# Patient Record
Sex: FEMALE | Race: OTHER | Employment: OTHER | URBAN - METROPOLITAN AREA
[De-identification: names, ages, dates, MRNs, and addresses within clinical notes are randomized per-mention and may not be internally consistent; named-entity substitution may affect disease eponyms.]

---

## 2018-08-13 LAB
BUN BLDV-MCNC: 15 MG/DL
CALCIUM SERPL-MCNC: 8.5 MG/DL
CHLORIDE BLD-SCNC: 107 MMOL/L
CO2: 27 MMOL/L
CREAT SERPL-MCNC: 0.74 MG/DL
GFR CALCULATED: >60
GLUCOSE BLD-MCNC: 95 MG/DL
POTASSIUM SERPL-SCNC: 4.4 MMOL/L
SODIUM BLD-SCNC: 141 MMOL/L

## 2018-09-15 LAB
BILIRUBIN, URINE: NEGATIVE
BLOOD, URINE: POSITIVE
CLARITY: CLEAR
COLOR: YELLOW
GLUCOSE URINE: NEGATIVE
HCT VFR BLD CALC: 36 % (ref 36–46)
HEMOGLOBIN: 12.3 G/DL (ref 12–16)
KETONES, URINE: NEGATIVE
LEUKOCYTE ESTERASE, URINE: NEGATIVE
NITRITE, URINE: NEGATIVE
PH UA: 6 (ref 4.5–8)
PLATELET # BLD: 182 K/ΜL
PROTEIN UA: NEGATIVE
SPECIFIC GRAVITY, URINE: 1.01
UROBILINOGEN, URINE: NORMAL
WBC # BLD: 5 10^3/ML

## 2018-11-01 ENCOUNTER — OFFICE VISIT (OUTPATIENT)
Dept: FAMILY MEDICINE CLINIC | Age: 68
End: 2018-11-01
Payer: MEDICARE

## 2018-11-01 ENCOUNTER — HOSPITAL ENCOUNTER (OUTPATIENT)
Age: 68
Discharge: HOME | End: 2018-11-03
Payer: MEDICARE

## 2018-11-01 VITALS
RESPIRATION RATE: 16 BRPM | SYSTOLIC BLOOD PRESSURE: 122 MMHG | HEART RATE: 62 BPM | TEMPERATURE: 98.1 F | HEIGHT: 61 IN | OXYGEN SATURATION: 96 % | BODY MASS INDEX: 33.04 KG/M2 | DIASTOLIC BLOOD PRESSURE: 68 MMHG | WEIGHT: 175 LBS

## 2018-11-01 DIAGNOSIS — Z91.81 AT HIGH RISK FOR FALLS: ICD-10-CM

## 2018-11-01 DIAGNOSIS — S83.231A COMPLEX TEAR OF MEDIAL MENISCUS OF RIGHT KNEE AS CURRENT INJURY, INITIAL ENCOUNTER: Primary | ICD-10-CM

## 2018-11-01 DIAGNOSIS — Z23 NEEDS FLU SHOT: ICD-10-CM

## 2018-11-01 DIAGNOSIS — Z79.899 ENCOUNTER FOR LONG-TERM (CURRENT) USE OF HIGH-RISK MEDICATION: ICD-10-CM

## 2018-11-01 DIAGNOSIS — M17.11 ARTHRITIS OF RIGHT KNEE: ICD-10-CM

## 2018-11-01 DIAGNOSIS — E11.42 DIABETIC PERIPHERAL NEUROPATHY (HCC): ICD-10-CM

## 2018-11-01 DIAGNOSIS — E11.9 TYPE 2 DIABETES MELLITUS WITHOUT COMPLICATION, WITH LONG-TERM CURRENT USE OF INSULIN (HCC): ICD-10-CM

## 2018-11-01 DIAGNOSIS — Z79.4 TYPE 2 DIABETES MELLITUS WITHOUT COMPLICATION, WITH LONG-TERM CURRENT USE OF INSULIN (HCC): ICD-10-CM

## 2018-11-01 LAB
AMPHETAMINE SCREEN, URINE: NOT DETECTED
BARBITURATE SCREEN URINE: NOT DETECTED
BENZODIAZEPINE SCREEN, URINE: NOT DETECTED
CANNABINOID SCREEN URINE: NOT DETECTED
COCAINE METABOLITE SCREEN URINE: NOT DETECTED
METHADONE SCREEN, URINE: NOT DETECTED
OPIATE SCREEN URINE: NOT DETECTED
PHENCYCLIDINE SCREEN URINE: NOT DETECTED
PROPOXYPHENE SCREEN: NOT DETECTED

## 2018-11-01 PROCEDURE — 80307 DRUG TEST PRSMV CHEM ANLYZR: CPT

## 2018-11-01 PROCEDURE — 3017F COLORECTAL CA SCREEN DOC REV: CPT | Performed by: FAMILY MEDICINE

## 2018-11-01 PROCEDURE — 1101F PT FALLS ASSESS-DOCD LE1/YR: CPT | Performed by: FAMILY MEDICINE

## 2018-11-01 PROCEDURE — G8400 PT W/DXA NO RESULTS DOC: HCPCS | Performed by: FAMILY MEDICINE

## 2018-11-01 PROCEDURE — 1036F TOBACCO NON-USER: CPT | Performed by: FAMILY MEDICINE

## 2018-11-01 PROCEDURE — 2022F DILAT RTA XM EVC RTNOPTHY: CPT | Performed by: FAMILY MEDICINE

## 2018-11-01 PROCEDURE — G8427 DOCREV CUR MEDS BY ELIG CLIN: HCPCS | Performed by: FAMILY MEDICINE

## 2018-11-01 PROCEDURE — 99204 OFFICE O/P NEW MOD 45 MIN: CPT | Performed by: FAMILY MEDICINE

## 2018-11-01 PROCEDURE — G8417 CALC BMI ABV UP PARAM F/U: HCPCS | Performed by: FAMILY MEDICINE

## 2018-11-01 PROCEDURE — 4040F PNEUMOC VAC/ADMIN/RCVD: CPT | Performed by: FAMILY MEDICINE

## 2018-11-01 PROCEDURE — G8482 FLU IMMUNIZE ORDER/ADMIN: HCPCS | Performed by: FAMILY MEDICINE

## 2018-11-01 PROCEDURE — 1123F ACP DISCUSS/DSCN MKR DOCD: CPT | Performed by: FAMILY MEDICINE

## 2018-11-01 PROCEDURE — 1090F PRES/ABSN URINE INCON ASSESS: CPT | Performed by: FAMILY MEDICINE

## 2018-11-01 PROCEDURE — 3046F HEMOGLOBIN A1C LEVEL >9.0%: CPT | Performed by: FAMILY MEDICINE

## 2018-11-01 PROCEDURE — G0008 ADMIN INFLUENZA VIRUS VAC: HCPCS | Performed by: FAMILY MEDICINE

## 2018-11-01 PROCEDURE — 90662 IIV NO PRSV INCREASED AG IM: CPT | Performed by: FAMILY MEDICINE

## 2018-11-01 RX ORDER — RANITIDINE 300 MG/1
300 TABLET ORAL NIGHTLY
COMMUNITY
End: 2019-01-18 | Stop reason: SDUPTHER

## 2018-11-01 RX ORDER — OMEPRAZOLE 40 MG/1
40 CAPSULE, DELAYED RELEASE ORAL DAILY
COMMUNITY
End: 2018-11-02 | Stop reason: SDUPTHER

## 2018-11-01 RX ORDER — PAROXETINE 10 MG/1
10 TABLET, FILM COATED ORAL EVERY MORNING
COMMUNITY
End: 2019-01-11 | Stop reason: SDUPTHER

## 2018-11-01 RX ORDER — GABAPENTIN 300 MG/1
300 CAPSULE ORAL 2 TIMES DAILY
COMMUNITY
End: 2019-01-18 | Stop reason: SDUPTHER

## 2018-11-01 RX ORDER — ATORVASTATIN CALCIUM 40 MG/1
40 TABLET, FILM COATED ORAL DAILY
COMMUNITY
End: 2019-01-11 | Stop reason: SDUPTHER

## 2018-11-01 RX ORDER — DIVALPROEX SODIUM 500 MG/1
500 TABLET, EXTENDED RELEASE ORAL DAILY
COMMUNITY
End: 2018-12-28 | Stop reason: SDUPTHER

## 2018-11-01 RX ORDER — CHOLECALCIFEROL (VITAMIN D3) 1250 MCG
1 CAPSULE ORAL WEEKLY
COMMUNITY
End: 2018-12-07

## 2018-11-01 RX ORDER — ASPIRIN 81 MG/1
81 TABLET ORAL DAILY
COMMUNITY
End: 2018-12-28 | Stop reason: SDUPTHER

## 2018-11-01 RX ORDER — LORATADINE 10 MG/1
10 TABLET ORAL PRN
COMMUNITY

## 2018-11-01 RX ORDER — INSULIN GLARGINE 100 [IU]/ML
22 INJECTION, SOLUTION SUBCUTANEOUS NIGHTLY
COMMUNITY

## 2018-11-01 RX ORDER — RAMIPRIL 5 MG/1
5 CAPSULE ORAL DAILY
COMMUNITY
End: 2019-01-18 | Stop reason: SDUPTHER

## 2018-11-01 ASSESSMENT — PATIENT HEALTH QUESTIONNAIRE - PHQ9
SUM OF ALL RESPONSES TO PHQ9 QUESTIONS 1 & 2: 0
SUM OF ALL RESPONSES TO PHQ QUESTIONS 1-9: 0
SUM OF ALL RESPONSES TO PHQ QUESTIONS 1-9: 0
1. LITTLE INTEREST OR PLEASURE IN DOING THINGS: 0
2. FEELING DOWN, DEPRESSED OR HOPELESS: 0

## 2018-11-01 NOTE — PROGRESS NOTES
22 Units into the skin nightly      divalproex (DEPAKOTE ER) 500 MG extended release tablet Take 500 mg by mouth daily      loratadine (CLARITIN) 10 MG tablet Take 10 mg by mouth daily      PARoxetine (PAXIL) 10 MG tablet Take 10 mg by mouth every morning      gabapentin (NEURONTIN) 300 MG capsule Take 300 mg by mouth 3 times daily. .      ramipril (ALTACE) 5 MG capsule Take 5 mg by mouth daily      aspirin 81 MG EC tablet Take 81 mg by mouth daily      omeprazole (PRILOSEC) 40 MG delayed release capsule Take 40 mg by mouth daily      Cholecalciferol (VITAMIN D3) 29093 units CAPS Take 1 capsule by mouth once a week       No current facility-administered medications for this visit. No Known Allergies    Family History   Problem Relation Age of Onset    Diabetes Mother     High Blood Pressure Mother     Heart Disease Mother     Diabetes Father     Heart Disease Father     High Blood Pressure Father     Prostate Cancer Father     Colon Cancer Father     Heart Attack Sister     Diabetes Brother     High Blood Pressure Brother     Diabetes Brother     High Blood Pressure Brother     Heart Attack Sister     Alzheimer's Disease Sister     Arthritis Sister     Heart Attack Sister     Diabetes Sister     Diabetes Sister     Heart Attack Sister     Heart Attack Sister     Cancer Son         lymphoma, stomach cancer    No Known Problems Daughter        Social History     Social History    Marital status: Single     Spouse name: N/A    Number of children: 2    Years of education: N/A     Occupational History    Not on file.      Social History Main Topics    Smoking status: Former Smoker     Types: Cigarettes     Quit date: 1978    Smokeless tobacco: Never Used    Alcohol use No    Drug use: No    Sexual activity: Not Currently     Other Topics Concern    Not on file     Social History Narrative    No narrative on file       Review of Systems :  General ROS: negative for - chills, fatigue or fever  Psychological ROS: negative for - anxiety, depression or suicidal ideation  Ophthalmic ROS: negative for - blurry vision or double vision  ENT ROS: nasal congestion. negative for -  nasal discharge, sinus pain or sore throat  Allergy and Immunology ROS: negative for - itchy/watery eyes,  postnasal drip or seasonal allergies  Hematological and Lymphatic ROS: negative for - bleeding problems, bruising, fatigue or swollen lymph nodes  Endocrine ROS: negative for - palpitations, polydipsia/polyuria or unexpected weight changes  Respiratory ROS: negative for - cough, shortness of breath, sputum changes, tachypnea or wheezing  Cardiovascular ROS: negative for - chest pain, dyspnea on exertion, edema, murmur, palpitations, rapid heart rate or shortness of breath  Gastrointestinal ROS: negative for - blood in stools, constipation, diarrhea, heartburn or nausea/vomiting  Genito-Urinary ROS: negative for - dysuria, hematuria, incontinence or nocturia  Musculoskeletal ROS:positive for knee pain.   negative for - gait disturbance, joint stiffness, joint swelling, muscle pain or muscular weakness  Neurological ROS: negative for - bowel and bladder control changes, dizziness, headaches, visual changes or weakness  Dermatological ROS: negative for - dry skin, eczema, mole changes or rash    Vitals:    11/01/18 0828   BP: 122/68   Site: Right Upper Arm   Position: Sitting   Cuff Size: Large Adult   Pulse: 62   Resp: 16   Temp: 98.1 °F (36.7 °C)   TempSrc: Oral   SpO2: 96%   Weight: 175 lb (79.4 kg)   Height: 5' 1\" (1.549 m)       General Appearance:  Alert, cooperative, no distress, appears stated age    Head:  Normocephalic, without obvious abnormality, atraumatic    Eyes:  PERRL, conjunctiva/corneas clear, EOM's intact, both eyes    Ears:  Normal TM's and external ear canals, both ears    Nose:  Nares normal, septum midline,mucosa normal, no drainage or sinus tenderness    Throat:  Lips, mucosa, and tongue normal; teeth and gums normal    Neck:  Supple, symmetrical, trachea midline, no adenopathy; thyroid: not enlarged, symmetric, no tenderness/mass/nodules    Back:  Symmetric, no curvature, ROM normal    Lungs:  Clear to auscultation bilaterally, respirations unlabored    Heart:  Regular rate and rhythm, S1 and S2 normal, no murmur, rub, or gallop    Abdomen:  Soft, non-tender, bowel sounds active all four quadrants, no masses, no organomegaly    Extremities:  Extremities normal, atraumatic, tenderness and decreased ROM of right knee. Pulses:  2+ and symmetric    Skin:  Skin color, texture, no rashes or lesions    Lymph nodes:  No cervical adenopathy    Neurologic:  Normal      1. Complex tear of medial meniscus of right knee as current injury, initial encounter  Reviewed MRI from previous PCP  Will refer to orthopedic surgeon. Will refer to physical therapy  Await further recommendations  - [facility] [place] [facility] and Iza Ibrahim MD    2. Arthritis of right knee  Reviewed MRI from previous PCP  Will refer to orthopedic surgeon. Will refer to physical therapy  Await further recommendations  - [facility] [place] and Iza Ibrahim MD    3. At high risk for falls  Home safety tips provided. 4. Needs flu shot  Flu vaccine updated today  - Influenza, High Dose, 65 yrs  and older, IM, PF, Prefill Syr, 0.5mL (FLUZONE HD)    5. Type 2 diabetes mellitus without complication, with long-term current use of insulin (HCC)  Well controlled  Continue current medications  Diet and exercise were discussed and recommended to the patient. 6. Diabetic peripheral neuropathy (HCC)  Neurontin as prescribed      7. Encounter for long-term (current) use of high-risk medication  Urine drug screen ordered. - Urine Drug Screen; Future      As above.   Call or go to ED immediately if symptoms

## 2018-11-02 RX ORDER — OMEPRAZOLE 40 MG/1
40 CAPSULE, DELAYED RELEASE ORAL DAILY
Qty: 90 CAPSULE | Refills: 1 | Status: SHIPPED | OUTPATIENT
Start: 2018-11-02 | End: 2019-01-18 | Stop reason: SDUPTHER

## 2018-11-02 RX ORDER — OMEPRAZOLE 40 MG/1
40 CAPSULE, DELAYED RELEASE ORAL DAILY
Qty: 30 CAPSULE | Refills: 5 | Status: SHIPPED | OUTPATIENT
Start: 2018-11-02 | End: 2018-11-02 | Stop reason: SDUPTHER

## 2018-11-05 LAB
BILIRUBIN, URINE: NEGATIVE
BLOOD, URINE: POSITIVE
CLARITY: NORMAL
COLOR: YELLOW
DIABETIC RETINOPATHY: NEGATIVE
GLUCOSE URINE: NEGATIVE
HCT VFR BLD CALC: 33.8 % (ref 36–46)
HEMOGLOBIN: 11.4 G/DL (ref 12–16)
KETONES, URINE: NEGATIVE
LEUKOCYTE ESTERASE, URINE: NORMAL
NITRITE, URINE: POSITIVE
PH UA: 6 (ref 4.5–8)
PLATELET # BLD: 151 K/ΜL
PROTEIN UA: NEGATIVE
SPECIFIC GRAVITY, URINE: 1.01
UROBILINOGEN, URINE: NORMAL
WBC # BLD: 4.3 10^3/ML

## 2018-11-09 ENCOUNTER — HOSPITAL ENCOUNTER (OUTPATIENT)
Dept: PHYSICAL THERAPY | Age: 68
Setting detail: THERAPIES SERIES
Discharge: HOME | End: 2018-11-09
Payer: MEDICARE

## 2018-11-09 PROCEDURE — G8978 MOBILITY CURRENT STATUS: HCPCS | Performed by: PHYSICAL THERAPIST

## 2018-11-09 PROCEDURE — 97161 PT EVAL LOW COMPLEX 20 MIN: CPT | Performed by: PHYSICAL THERAPIST

## 2018-11-09 PROCEDURE — G8979 MOBILITY GOAL STATUS: HCPCS | Performed by: PHYSICAL THERAPIST

## 2018-11-09 ASSESSMENT — PAIN DESCRIPTION - LOCATION: LOCATION: KNEE

## 2018-11-09 ASSESSMENT — PAIN DESCRIPTION - PAIN TYPE: TYPE: CHRONIC PAIN

## 2018-11-09 ASSESSMENT — PAIN DESCRIPTION - DESCRIPTORS: DESCRIPTORS: SHARP

## 2018-11-09 ASSESSMENT — PAIN DESCRIPTION - FREQUENCY: FREQUENCY: CONTINUOUS

## 2018-11-09 ASSESSMENT — PAIN SCALES - GENERAL: PAINLEVEL_OUTOF10: 10

## 2018-11-09 ASSESSMENT — PAIN DESCRIPTION - ORIENTATION: ORIENTATION: RIGHT

## 2018-11-12 ENCOUNTER — HOSPITAL ENCOUNTER (OUTPATIENT)
Dept: PHYSICAL THERAPY | Age: 68
Setting detail: THERAPIES SERIES
Discharge: HOME | End: 2018-11-12
Payer: MEDICARE

## 2018-11-12 PROCEDURE — G0283 ELEC STIM OTHER THAN WOUND: HCPCS | Performed by: PHYSICAL THERAPIST

## 2018-11-12 PROCEDURE — 97110 THERAPEUTIC EXERCISES: CPT | Performed by: PHYSICAL THERAPIST

## 2018-11-16 ENCOUNTER — HOSPITAL ENCOUNTER (OUTPATIENT)
Dept: PHYSICAL THERAPY | Age: 68
Setting detail: THERAPIES SERIES
Discharge: HOME | End: 2018-11-16
Payer: MEDICARE

## 2018-11-16 PROCEDURE — 97110 THERAPEUTIC EXERCISES: CPT | Performed by: PHYSICAL THERAPIST

## 2018-11-16 PROCEDURE — G0283 ELEC STIM OTHER THAN WOUND: HCPCS | Performed by: PHYSICAL THERAPIST

## 2018-11-16 NOTE — PROGRESS NOTES
[facility]  Phone: [phone] Fax: [phone]       Physical Therapy Daily Treatment Note  Date:  2018    Patient Name:  Shanice Farley    :  1950  MRN: [id number]    Restrictions/Precautions:    Diagnosis:  complex tear of medial meniscus R knee   Treatment Diagnosis:    Insurance/Certification information: [employer]      Referring Physician:  Jessica Marquez of care signed (Y/N):    Visit# / total visits:  3/  Pain level: 0/10  Time In:  820     Time Out:   910     Subjective:  Pt presents to PT with no c/o knee pain today. Has cont to have intermittent symptoms at home. Cont to walk without AD. Exercises:  Exercise/Equipment Resistance/Repetitions Other comments   bike   x5mins           Heel slides    x15    Quad sets    x15    SAQ   x20    SLR    x10                                                                                                    Other Therapeutic Activities:      Home Exercise Program:      Manual Treatments:      Modalities:  MH/IFC x 20mins     Timed Code Treatment Minutes: Total Treatment Minutes:      Treatment/Activity Tolerance:  [x] Patient tolerated treatment well [] Patient limited by fatigue  [] Patient limited by pain  [] Patient limited by other medical complications  [x] Other: AROM of R knee is WFL . Mild pain reported with there ex. Gait remains stable with heel-toe progression.  Pt instructed to cont with HEP daily     Prognosis: [] Good [] Fair  [] Poor    Patient Requires Follow-up: [] Yes  [] No    Plan:   [x] Continue per plan of care [] Alter current plan (see comments)  [] Plan of care initiated [] Hold pending MD visit [] Discharge  Plan for Next Session:         Electronically signed by:  Shanna Hall DPT

## 2018-11-26 ENCOUNTER — HOSPITAL ENCOUNTER (OUTPATIENT)
Dept: PHYSICAL THERAPY | Age: 68
Setting detail: THERAPIES SERIES
Discharge: HOME | End: 2018-11-26
Payer: MEDICARE

## 2018-11-26 PROCEDURE — 97110 THERAPEUTIC EXERCISES: CPT | Performed by: PHYSICAL THERAPIST

## 2018-11-26 PROCEDURE — G0283 ELEC STIM OTHER THAN WOUND: HCPCS | Performed by: PHYSICAL THERAPIST

## 2018-11-26 NOTE — PROGRESS NOTES
[facility]  Phone: [phone] Fax: [phone]       Physical Therapy Daily Treatment Note  Date:  2018    Patient Name:  Fabi Fowler    :  1950  MRN: [id number]    Restrictions/Precautions:    Diagnosis:  complex tear of medial meniscus R knee   Treatment Diagnosis:    Insurance/Certification information: [employer]      Referring Physician:  Austin Martinez of care signed (Y/N):    Visit# / total visits:  4/  Pain level: 5/10  Time In:  825     Time Out:   915     Subjective:  Pt presents to PT with c/o persistent knee pain. She remains inconsistent with HEP. Ambulating without AD. Exercises:  Exercise/Equipment Resistance/Repetitions Other comments   bike   [id number]           Heel slides    2x15    Quad sets    2x15    SAQ   2x15 2lbs    SLR    2x10 2lbs                                                                                                    Other Therapeutic Activities:      Home Exercise Program:      Manual Treatments:      Modalities:  MH/IFC x 20mins     Timed Code Treatment Minutes: Total Treatment Minutes:      Treatment/Activity Tolerance:  [x] Patient tolerated treatment well [] Patient limited by fatigue  [] Patient limited by pain  [] Patient limited by other medical complications  [x] Other: AROM of R knee is WFL . Pain/tightess reported with there ex. Gait remains stable with heel-toe progression.  PT reinforced that pt should cont with HEP daily     Prognosis: [] Good [] Fair  [] Poor    Patient Requires Follow-up: [] Yes  [] No    Plan:   [x] Continue per plan of care [] Alter current plan (see comments)  [] Plan of care initiated [] Hold pending MD visit [] Discharge  Plan for Next Session:         Electronically signed by:  Reynaldo Barreto DPT

## 2018-11-30 ENCOUNTER — HOSPITAL ENCOUNTER (OUTPATIENT)
Dept: PHYSICAL THERAPY | Age: 68
Setting detail: THERAPIES SERIES
Discharge: HOME | End: 2018-11-30
Payer: MEDICARE

## 2018-11-30 PROCEDURE — G0283 ELEC STIM OTHER THAN WOUND: HCPCS | Performed by: PHYSICAL THERAPIST

## 2018-11-30 PROCEDURE — 97110 THERAPEUTIC EXERCISES: CPT | Performed by: PHYSICAL THERAPIST

## 2018-12-03 ENCOUNTER — HOSPITAL ENCOUNTER (OUTPATIENT)
Dept: PHYSICAL THERAPY | Age: 68
Setting detail: THERAPIES SERIES
Discharge: HOME | End: 2018-12-03
Payer: MEDICARE

## 2018-12-03 PROCEDURE — 97110 THERAPEUTIC EXERCISES: CPT | Performed by: PHYSICAL THERAPIST

## 2018-12-03 PROCEDURE — G0283 ELEC STIM OTHER THAN WOUND: HCPCS | Performed by: PHYSICAL THERAPIST

## 2018-12-07 ENCOUNTER — OFFICE VISIT (OUTPATIENT)
Dept: ORTHOPEDIC SURGERY | Age: 68
End: 2018-12-07
Payer: MEDICARE

## 2018-12-07 VITALS
HEIGHT: 62 IN | BODY MASS INDEX: 31.83 KG/M2 | DIASTOLIC BLOOD PRESSURE: 70 MMHG | WEIGHT: 173 LBS | SYSTOLIC BLOOD PRESSURE: 144 MMHG | TEMPERATURE: 98.2 F | HEART RATE: 64 BPM

## 2018-12-07 DIAGNOSIS — M25.561 CHRONIC PAIN OF RIGHT KNEE: Primary | ICD-10-CM

## 2018-12-07 DIAGNOSIS — G89.29 CHRONIC PAIN OF RIGHT KNEE: Primary | ICD-10-CM

## 2018-12-07 PROCEDURE — 1101F PT FALLS ASSESS-DOCD LE1/YR: CPT | Performed by: ORTHOPAEDIC SURGERY

## 2018-12-07 PROCEDURE — G8417 CALC BMI ABV UP PARAM F/U: HCPCS | Performed by: ORTHOPAEDIC SURGERY

## 2018-12-07 PROCEDURE — G8400 PT W/DXA NO RESULTS DOC: HCPCS | Performed by: ORTHOPAEDIC SURGERY

## 2018-12-07 PROCEDURE — 99203 OFFICE O/P NEW LOW 30 MIN: CPT | Performed by: ORTHOPAEDIC SURGERY

## 2018-12-07 PROCEDURE — G8482 FLU IMMUNIZE ORDER/ADMIN: HCPCS | Performed by: ORTHOPAEDIC SURGERY

## 2018-12-07 PROCEDURE — 1036F TOBACCO NON-USER: CPT | Performed by: ORTHOPAEDIC SURGERY

## 2018-12-07 PROCEDURE — 1090F PRES/ABSN URINE INCON ASSESS: CPT | Performed by: ORTHOPAEDIC SURGERY

## 2018-12-07 PROCEDURE — G8427 DOCREV CUR MEDS BY ELIG CLIN: HCPCS | Performed by: ORTHOPAEDIC SURGERY

## 2018-12-07 PROCEDURE — 3017F COLORECTAL CA SCREEN DOC REV: CPT | Performed by: ORTHOPAEDIC SURGERY

## 2018-12-07 PROCEDURE — 4040F PNEUMOC VAC/ADMIN/RCVD: CPT | Performed by: ORTHOPAEDIC SURGERY

## 2018-12-07 PROCEDURE — 1123F ACP DISCUSS/DSCN MKR DOCD: CPT | Performed by: ORTHOPAEDIC SURGERY

## 2018-12-07 NOTE — PROGRESS NOTES
New Knee Patient     Referring Provider:   Jayson Bolivar,   [address] [facility], [address] [place] [address].     Dear Dr. Alcon Dunaway,    Thank you for your referral of Shamar Laird.  She was seen by me on 12/7/18. Please refer to my office note below. If you have any questions or concerns, please do not hesitate to call. Maciel Gutierres MD  Orthopaedic Surgery                     CHIEF COMPLAINT:   Chief Complaint   Patient presents with    Knee Pain     (R) knee pain, she states that 12 years ago had meniscus sx (2006-- [facility]) but knee has been bothersome x 1 year,, pain with ADLs all the time in pain    Results     (R) knee XR today, MRI report scaned into system-- [facility]    Injections     she states that she was given an injection in [place] 2018--- Hymovis (24mg/3ml each 2 pre-filled)        HPI:    Fortino Khan is a 77y.o. year old female who is seen today  for evaluation of right knee pain. She reports the pain has been ongoing for the past several years. She does not recall a specific injury which started the pain. The pain is mainly along the medial side of the knee. She did have a knee arthroscopy 12 years ago in [place] and had meniscectomy. Did well initially from this. Recently her pain has returned and has become more severe. She has had injections in the past as well as physical therapy, neither of which have helped. She is here because she is interested in surgical options.       PAST MEDICAL HISTORY  Past Medical History:   Diagnosis Date    Anemia     low iron    CAD (coronary artery disease)     Hyperlipidemia     Hypertension     Osteoarthritis     Type 2 diabetes mellitus without complication ([facility] [address]        PAST SURGICAL HISTORY  Past Surgical History:   Procedure Laterality Date    CARDIAC SURGERY  2015    CAD    CATARACT REMOVAL      bilateral    HYSTERECTOMY      KNEE SURGERY Right 2006    right knee surgery         FAMILY HISTORY   Family History

## 2018-12-11 ENCOUNTER — TELEPHONE (OUTPATIENT)
Dept: FAMILY MEDICINE CLINIC | Age: 68
End: 2018-12-11

## 2018-12-11 ENCOUNTER — HOSPITAL ENCOUNTER (OUTPATIENT)
Age: 68
Discharge: HOME | End: 2018-12-13
Payer: MEDICARE

## 2018-12-11 ENCOUNTER — OFFICE VISIT (OUTPATIENT)
Dept: FAMILY MEDICINE CLINIC | Age: 68
End: 2018-12-11
Payer: MEDICARE

## 2018-12-11 VITALS
HEART RATE: 77 BPM | TEMPERATURE: 98.1 F | WEIGHT: 180 LBS | RESPIRATION RATE: 16 BRPM | OXYGEN SATURATION: 97 % | BODY MASS INDEX: 33.99 KG/M2 | HEIGHT: 61 IN | DIASTOLIC BLOOD PRESSURE: 68 MMHG | SYSTOLIC BLOOD PRESSURE: 124 MMHG

## 2018-12-11 DIAGNOSIS — E78.5 HYPERLIPIDEMIA ASSOCIATED WITH TYPE 2 DIABETES MELLITUS (HCC): ICD-10-CM

## 2018-12-11 DIAGNOSIS — I25.10 CORONARY ARTERY DISEASE INVOLVING NATIVE HEART WITHOUT ANGINA PECTORIS, UNSPECIFIED VESSEL OR LESION TYPE: ICD-10-CM

## 2018-12-11 DIAGNOSIS — Z23 NEED FOR PROPHYLACTIC VACCINATION AGAINST STREPTOCOCCUS PNEUMONIAE (PNEUMOCOCCUS): ICD-10-CM

## 2018-12-11 DIAGNOSIS — I10 ESSENTIAL HYPERTENSION: ICD-10-CM

## 2018-12-11 DIAGNOSIS — E11.9 TYPE 2 DIABETES MELLITUS WITHOUT COMPLICATION, WITH LONG-TERM CURRENT USE OF INSULIN (HCC): ICD-10-CM

## 2018-12-11 DIAGNOSIS — Z79.4 TYPE 2 DIABETES MELLITUS WITHOUT COMPLICATION, WITH LONG-TERM CURRENT USE OF INSULIN (HCC): ICD-10-CM

## 2018-12-11 DIAGNOSIS — M17.11 PRIMARY OSTEOARTHRITIS OF RIGHT KNEE: ICD-10-CM

## 2018-12-11 DIAGNOSIS — Z79.4 TYPE 2 DIABETES MELLITUS WITHOUT COMPLICATION, WITH LONG-TERM CURRENT USE OF INSULIN (HCC): Primary | ICD-10-CM

## 2018-12-11 DIAGNOSIS — E11.69 HYPERLIPIDEMIA ASSOCIATED WITH TYPE 2 DIABETES MELLITUS (HCC): ICD-10-CM

## 2018-12-11 DIAGNOSIS — E11.9 TYPE 2 DIABETES MELLITUS WITHOUT COMPLICATION, WITH LONG-TERM CURRENT USE OF INSULIN (HCC): Primary | ICD-10-CM

## 2018-12-11 DIAGNOSIS — Z01.810 PREOPERATIVE CARDIOVASCULAR EXAMINATION: Primary | ICD-10-CM

## 2018-12-11 LAB
ALBUMIN SERPL-MCNC: 4 G/DL (ref 3.5–5.2)
ALP BLD-CCNC: 122 U/L (ref 35–104)
ALT SERPL-CCNC: 8 U/L (ref 0–32)
ANION GAP SERPL CALCULATED.3IONS-SCNC: 16 MMOL/L (ref 7–16)
AST SERPL-CCNC: 15 U/L (ref 0–31)
BASOPHILS ABSOLUTE: 0.02 E9/L (ref 0–0.2)
BASOPHILS RELATIVE PERCENT: 0.5 % (ref 0–2)
BILIRUB SERPL-MCNC: 0.3 MG/DL (ref 0–1.2)
BUN BLDV-MCNC: 14 MG/DL (ref 8–23)
CALCIUM SERPL-MCNC: 8.5 MG/DL (ref 8.6–10.2)
CHLORIDE BLD-SCNC: 102 MMOL/L (ref 98–107)
CHOLESTEROL, TOTAL: 123 MG/DL (ref 0–199)
CO2: 23 MMOL/L (ref 22–29)
CREAT SERPL-MCNC: 0.7 MG/DL (ref 0.5–1)
EOSINOPHILS ABSOLUTE: 0.2 E9/L (ref 0.05–0.5)
EOSINOPHILS RELATIVE PERCENT: 5 % (ref 0–6)
GFR AFRICAN AMERICAN: >60
GFR NON-AFRICAN AMERICAN: >60 ML/MIN/1.73
GLUCOSE BLD-MCNC: 202 MG/DL (ref 74–99)
HBA1C MFR BLD: 6.4 %
HCT VFR BLD CALC: 38.6 % (ref 34–48)
HDLC SERPL-MCNC: 43 MG/DL
HEMOGLOBIN: 12.2 G/DL (ref 11.5–15.5)
IMMATURE GRANULOCYTES #: 0.02 E9/L
IMMATURE GRANULOCYTES %: 0.5 % (ref 0–5)
LDL CHOLESTEROL CALCULATED: 48 MG/DL (ref 0–99)
LYMPHOCYTES ABSOLUTE: 1.02 E9/L (ref 1.5–4)
LYMPHOCYTES RELATIVE PERCENT: 25.4 % (ref 20–42)
MCH RBC QN AUTO: 28.9 PG (ref 26–35)
MCHC RBC AUTO-ENTMCNC: 31.6 % (ref 32–34.5)
MCV RBC AUTO: 91.5 FL (ref 80–99.9)
MONOCYTES ABSOLUTE: 0.3 E9/L (ref 0.1–0.95)
MONOCYTES RELATIVE PERCENT: 7.5 % (ref 2–12)
NEUTROPHILS ABSOLUTE: 2.45 E9/L (ref 1.8–7.3)
NEUTROPHILS RELATIVE PERCENT: 61.1 % (ref 43–80)
PDW BLD-RTO: 13.4 FL (ref 11.5–15)
PLATELET # BLD: 168 E9/L (ref 130–450)
PMV BLD AUTO: 11.7 FL (ref 7–12)
POTASSIUM SERPL-SCNC: 4.3 MMOL/L (ref 3.5–5)
RBC # BLD: 4.22 E12/L (ref 3.5–5.5)
SODIUM BLD-SCNC: 141 MMOL/L (ref 132–146)
TOTAL PROTEIN: 7.3 G/DL (ref 6.4–8.3)
TRIGL SERPL-MCNC: 159 MG/DL (ref 0–149)
VLDLC SERPL CALC-MCNC: 32 MG/DL
WBC # BLD: 4 E9/L (ref 4.5–11.5)

## 2018-12-11 PROCEDURE — G8417 CALC BMI ABV UP PARAM F/U: HCPCS | Performed by: FAMILY MEDICINE

## 2018-12-11 PROCEDURE — 80061 LIPID PANEL: CPT

## 2018-12-11 PROCEDURE — 3017F COLORECTAL CA SCREEN DOC REV: CPT | Performed by: FAMILY MEDICINE

## 2018-12-11 PROCEDURE — 85025 COMPLETE CBC W/AUTO DIFF WBC: CPT

## 2018-12-11 PROCEDURE — 2022F DILAT RTA XM EVC RTNOPTHY: CPT | Performed by: FAMILY MEDICINE

## 2018-12-11 PROCEDURE — G8427 DOCREV CUR MEDS BY ELIG CLIN: HCPCS | Performed by: FAMILY MEDICINE

## 2018-12-11 PROCEDURE — 93000 ELECTROCARDIOGRAM COMPLETE: CPT | Performed by: FAMILY MEDICINE

## 2018-12-11 PROCEDURE — 1101F PT FALLS ASSESS-DOCD LE1/YR: CPT | Performed by: FAMILY MEDICINE

## 2018-12-11 PROCEDURE — 1123F ACP DISCUSS/DSCN MKR DOCD: CPT | Performed by: FAMILY MEDICINE

## 2018-12-11 PROCEDURE — G0009 ADMIN PNEUMOCOCCAL VACCINE: HCPCS | Performed by: FAMILY MEDICINE

## 2018-12-11 PROCEDURE — 1036F TOBACCO NON-USER: CPT | Performed by: FAMILY MEDICINE

## 2018-12-11 PROCEDURE — G8482 FLU IMMUNIZE ORDER/ADMIN: HCPCS | Performed by: FAMILY MEDICINE

## 2018-12-11 PROCEDURE — 3044F HG A1C LEVEL LT 7.0%: CPT | Performed by: FAMILY MEDICINE

## 2018-12-11 PROCEDURE — 80053 COMPREHEN METABOLIC PANEL: CPT

## 2018-12-11 PROCEDURE — 4040F PNEUMOC VAC/ADMIN/RCVD: CPT | Performed by: FAMILY MEDICINE

## 2018-12-11 PROCEDURE — 1090F PRES/ABSN URINE INCON ASSESS: CPT | Performed by: FAMILY MEDICINE

## 2018-12-11 PROCEDURE — 99214 OFFICE O/P EST MOD 30 MIN: CPT | Performed by: FAMILY MEDICINE

## 2018-12-11 PROCEDURE — 83036 HEMOGLOBIN GLYCOSYLATED A1C: CPT | Performed by: FAMILY MEDICINE

## 2018-12-11 PROCEDURE — G8400 PT W/DXA NO RESULTS DOC: HCPCS | Performed by: FAMILY MEDICINE

## 2018-12-11 PROCEDURE — G8598 ASA/ANTIPLAT THER USED: HCPCS | Performed by: FAMILY MEDICINE

## 2018-12-11 PROCEDURE — 90670 PCV13 VACCINE IM: CPT | Performed by: FAMILY MEDICINE

## 2018-12-11 RX ORDER — BLOOD-GLUCOSE METER
1 KIT MISCELLANEOUS DAILY
Qty: 1 KIT | Refills: 0 | Status: SHIPPED | OUTPATIENT
Start: 2018-12-11

## 2018-12-11 ASSESSMENT — ENCOUNTER SYMPTOMS
ABDOMINAL PAIN: 0
WHEEZING: 0
NAUSEA: 0
SHORTNESS OF BREATH: 0
BACK PAIN: 0
CONSTIPATION: 0
RHINORRHEA: 0
VOMITING: 0
SINUS PRESSURE: 0
COUGH: 0
DIARRHEA: 0
SORE THROAT: 0

## 2018-12-11 NOTE — PATIENT INSTRUCTIONS
Patient Education          pneumococcal 13-valent conjugate vaccine  Pronunciation:  ANA charles AUSTIN al 13-VAY lent NALLELY hanson VAX een  Brand:  Prevnar 15  What is the most important information I should know about this vaccine? For children, the pneumococcal 13-valent vaccine is given in a series of shots. The first shot is usually given when the child is 3 months old. The booster shots are then given at 4 months, 6 months, and 15to 13months of age. Adults usually receive only one dose of the vaccine. In a child older than 6 months who has not yet received this vaccine, the first dose can be given any time from the age of 10 months through 11 years (before the 7th birthday). If the child is less than 3year old at the time of the first shot, he or she will need 2 booster doses. If the child is 15 to 22 months old at the time of the first shot, he or she will need 1 booster dose. A child who is 2 years or older at the time of the first shot may need only the one shot and no booster doses. The timing of this vaccination is very important for it to be effective. Your child's individual booster schedule may be different from these guidelines. Follow your doctor's instructions or the schedule recommended by the health department of the state you live in. Keep track of any and all side effects your child has after receiving this vaccine. When the child receives a booster dose, you will need to tell the doctor if the previous shot caused any side effects. You can still receive a vaccine if you have a minor cold. In the case of a more severe illness with a fever or any type of infection, wait until you get better before receiving this vaccine. Becoming infected with pneumococcal disease (such as pneumonia or meningitis) is much more dangerous to your health than receiving this vaccine. However, like any medicine, this vaccine can cause side effects but the risk of serious side effects is extremely low.   Be sure to keep your child on a regular schedule for other immunizations against diseases such as diphtheria, tetanus, pertussis (whooping cough), measles, mumps, hepatitis, or varicella (chicken pox). Your doctor or state health department can provide you with a recommended immunization schedule. What is pneumococcal 13-valent conjugate vaccine? Pneumococcal disease is a serious infection caused by a bacteria. Pneumococcal bacteria can infect the sinuses and inner ear. It can also infect the lungs, blood, and brain, and these conditions can be fatal.  Pneumococcal 13-valent vaccine is used to prevent infection caused by pneumococcal bacteria. This vaccine contains 13 different types of pneumococcal bacteria. Pneumococcal 13-valent vaccine works by exposing you to a small amount of the bacteria or a protein from the bacteria, which causes the body to develop immunity to the disease. This vaccine will not treat an active infection that has already developed in the body. Pneumococcal 13-valent vaccine is for use in children from 6 weeks to 11years old, and in adults who are 48 and older. Becoming infected with pneumococcal disease (such as pneumonia or meningitis) is much more dangerous to your health than receiving this vaccine. However, like any medicine, this vaccine can cause side effects but the risk of serious side effects is extremely low. Like any vaccine, pneumococcal 13-valent vaccine may not provide protection from disease in every person. What should I discuss with my healthcare provider before receiving this vaccine? Keep track of any and all side effects your child has after receiving this vaccine. When the child receives a booster dose, you will need to tell the doctor if the previous shot caused any side effects. You should not receive this vaccine if you ever had a severe allergic reaction to a pneumococcal or diphtheria vaccine.   Before your child receives this vaccine, tell your doctor if the child shot is given and for the next 24 hours. Follow the label directions or your doctor's instructions about how much of this medicine to give your child. It is especially important to prevent fever from occurring in a child who has a seizure disorder such as epilepsy. Be sure to keep your child on a regular schedule for other immunizations such as diphtheria, tetanus, pertussis (whooping cough), hepatitis, and varicella (chicken pox). Your doctor or state health department can provide you with a recommended immunization schedule. What happens if I miss a dose? Contact your doctor if your child will miss a booster dose or gets behind schedule. The next dose should be given as soon as possible. There is no need to start over. Be sure your child receives all recommended doses of this vaccine. If your child does not receive the full series of vaccines, he or she may not be fully protected against the disease. What happens if I overdose? An overdose of this vaccine is unlikely to occur. What should I avoid before or after receiving this vaccine? Follow your doctor's instructions about any restrictions on food, beverages, or activity. What are the possible side effects of this vaccine? Your child should not receive a booster vaccine if he or she had a life-threatening allergic reaction after the first shot. Keep track of any and all side effects your child has after receiving this vaccine. When the child receives a booster dose, you will need to tell the doctor if the previous shot caused any side effects. Get emergency medical help if your child has any of these signs of an allergic reaction: hives; difficulty breathing; swelling of the face, lips, tongue, or throat.   Call your doctor at once if you or your child has a serious side effect such as:  · high fever (103 degrees or higher);  · seizure (convulsions);  · wheezing, trouble breathing;  · severe stomach pain, severe vomiting or diarrhea;  · easy bruising pharmacist can provide more information about this vaccine. Additional information is available from your local health department or the Centers for Disease Control and Prevention. Remember, keep this and all other medicines out of the reach of children, never share your medicines with others, and use this medication only for the indication prescribed. Every effort has been made to ensure that the information provided by [address] is accurate, up-to-date, and complete, but no guarantee is made to that effect. Drug information contained herein may be time sensitive. [facility] information has been compiled for use by healthcare practitioners and consumers in the [place] and therefore [facility] does not warrant that uses outside of the [place] are appropriate, unless specifically indicated otherwise. [facility]'s drug information does not endorse drugs, diagnose patients or recommend therapy. [facility]'s drug information is an informational resource designed to assist licensed healthcare practitioners in caring for their patients and/or to serve consumers viewing this service as a supplement to, and not a substitute for, the expertise, skill, knowledge and judgment of healthcare practitioners. The absence of a warning for a given drug or drug combination in no way should be construed to indicate that the drug or drug combination is safe, effective or appropriate for any given patient. [facility] does not assume any responsibility for any aspect of healthcare administered with the aid of information [facility] provides. The information contained herein is not intended to cover all possible uses, directions, precautions, warnings, drug interactions, allergic reactions, or adverse effects. If you have questions about the drugs you are taking, check with your doctor, nurse or pharmacist.  Copyright 7246-3552 [address]. Version: 4.01. Revision date: 1/16/2012.   Care instructions adapted under license by [facility] [place] ([facility]). If you have questions about a medical condition or this instruction, always ask your healthcare professional. [place] [address] any warranty or liability for your use of this information.

## 2018-12-14 ENCOUNTER — TELEPHONE (OUTPATIENT)
Dept: ADMINISTRATIVE | Age: 68
End: 2018-12-14

## 2018-12-28 RX ORDER — DIVALPROEX SODIUM 500 MG/1
500 TABLET, EXTENDED RELEASE ORAL DAILY
Qty: 30 TABLET | Refills: 0 | Status: SHIPPED | OUTPATIENT
Start: 2018-12-28 | End: 2019-01-11 | Stop reason: SDUPTHER

## 2018-12-28 RX ORDER — ASPIRIN 81 MG/1
81 TABLET ORAL DAILY
Qty: 30 TABLET | Refills: 0 | Status: SHIPPED | OUTPATIENT
Start: 2018-12-28 | End: 2019-01-11 | Stop reason: SDUPTHER

## 2018-12-30 ENCOUNTER — APPOINTMENT (OUTPATIENT)
Dept: GENERAL RADIOLOGY | Age: 68
End: 2018-12-30
Payer: MEDICARE

## 2018-12-30 ENCOUNTER — HOSPITAL ENCOUNTER (EMERGENCY)
Age: 68
Discharge: HOME | End: 2018-12-30
Payer: MEDICARE

## 2018-12-30 VITALS
RESPIRATION RATE: 18 BRPM | TEMPERATURE: 99 F | WEIGHT: 180 LBS | HEIGHT: 61 IN | SYSTOLIC BLOOD PRESSURE: 145 MMHG | DIASTOLIC BLOOD PRESSURE: 66 MMHG | HEART RATE: 66 BPM | BODY MASS INDEX: 33.99 KG/M2 | OXYGEN SATURATION: 96 %

## 2018-12-30 DIAGNOSIS — L02.91 ABSCESS: Primary | ICD-10-CM

## 2018-12-30 PROCEDURE — 99283 EMERGENCY DEPT VISIT LOW MDM: CPT

## 2018-12-30 PROCEDURE — 73590 X-RAY EXAM OF LOWER LEG: CPT

## 2018-12-30 RX ORDER — CLINDAMYCIN HYDROCHLORIDE 150 MG/1
300 CAPSULE ORAL 3 TIMES DAILY
Qty: 60 CAPSULE | Refills: 0 | Status: SHIPPED | OUTPATIENT
Start: 2018-12-30 | End: 2019-01-09

## 2018-12-30 ASSESSMENT — PAIN DESCRIPTION - ORIENTATION: ORIENTATION: RIGHT

## 2018-12-30 ASSESSMENT — PAIN DESCRIPTION - LOCATION: LOCATION: LEG

## 2018-12-30 ASSESSMENT — PAIN DESCRIPTION - DESCRIPTORS: DESCRIPTORS: ACHING

## 2018-12-30 ASSESSMENT — PAIN SCALES - GENERAL: PAINLEVEL_OUTOF10: 7

## 2018-12-30 ASSESSMENT — PAIN DESCRIPTION - PAIN TYPE: TYPE: ACUTE PAIN

## 2019-01-11 RX ORDER — ASPIRIN 81 MG/1
81 TABLET ORAL DAILY
Qty: 30 TABLET | Refills: 3 | Status: ON HOLD | OUTPATIENT
Start: 2019-01-11 | End: 2019-03-13 | Stop reason: HOSPADM

## 2019-01-11 RX ORDER — PAROXETINE 10 MG/1
10 TABLET, FILM COATED ORAL EVERY MORNING
Qty: 30 TABLET | Refills: 3 | Status: SHIPPED | OUTPATIENT
Start: 2019-01-11 | End: 2019-01-11 | Stop reason: SDUPTHER

## 2019-01-11 RX ORDER — ATORVASTATIN CALCIUM 40 MG/1
40 TABLET, FILM COATED ORAL DAILY
Qty: 30 TABLET | Refills: 3 | Status: SHIPPED | OUTPATIENT
Start: 2019-01-11 | End: 2019-01-13 | Stop reason: SDUPTHER

## 2019-01-11 RX ORDER — DIVALPROEX SODIUM 500 MG/1
500 TABLET, EXTENDED RELEASE ORAL DAILY
Qty: 30 TABLET | Refills: 3 | Status: SHIPPED | OUTPATIENT
Start: 2019-01-11 | End: 2019-01-18 | Stop reason: SDUPTHER

## 2019-01-14 RX ORDER — PAROXETINE 10 MG/1
10 TABLET, FILM COATED ORAL EVERY MORNING
Qty: 90 TABLET | Refills: 3 | Status: SHIPPED | OUTPATIENT
Start: 2019-01-14

## 2019-01-14 RX ORDER — ATORVASTATIN CALCIUM 40 MG/1
40 TABLET, FILM COATED ORAL DAILY
Qty: 90 TABLET | Refills: 3 | Status: SHIPPED | OUTPATIENT
Start: 2019-01-14

## 2019-01-18 ENCOUNTER — OFFICE VISIT (OUTPATIENT)
Dept: FAMILY MEDICINE CLINIC | Age: 69
End: 2019-01-18
Payer: MEDICARE

## 2019-01-18 ENCOUNTER — HOSPITAL ENCOUNTER (OUTPATIENT)
Dept: ULTRASOUND IMAGING | Age: 69
Discharge: HOME | End: 2019-01-20
Payer: MEDICARE

## 2019-01-18 VITALS
DIASTOLIC BLOOD PRESSURE: 68 MMHG | HEIGHT: 62 IN | BODY MASS INDEX: 33.23 KG/M2 | OXYGEN SATURATION: 96 % | HEART RATE: 62 BPM | TEMPERATURE: 98.1 F | WEIGHT: 180.6 LBS | SYSTOLIC BLOOD PRESSURE: 128 MMHG

## 2019-01-18 DIAGNOSIS — R22.41 LEG MASS, RIGHT: Primary | ICD-10-CM

## 2019-01-18 DIAGNOSIS — F32.A DEPRESSION, UNSPECIFIED DEPRESSION TYPE: ICD-10-CM

## 2019-01-18 DIAGNOSIS — I10 ESSENTIAL HYPERTENSION: ICD-10-CM

## 2019-01-18 DIAGNOSIS — K21.9 GASTROESOPHAGEAL REFLUX DISEASE WITHOUT ESOPHAGITIS: ICD-10-CM

## 2019-01-18 DIAGNOSIS — R22.41 LEG MASS, RIGHT: ICD-10-CM

## 2019-01-18 DIAGNOSIS — E11.42 DIABETIC PERIPHERAL NEUROPATHY (HCC): ICD-10-CM

## 2019-01-18 DIAGNOSIS — Z79.4 TYPE 2 DIABETES MELLITUS WITHOUT COMPLICATION, WITH LONG-TERM CURRENT USE OF INSULIN (HCC): ICD-10-CM

## 2019-01-18 DIAGNOSIS — E55.9 VITAMIN D DEFICIENCY: ICD-10-CM

## 2019-01-18 DIAGNOSIS — E11.9 TYPE 2 DIABETES MELLITUS WITHOUT COMPLICATION, WITH LONG-TERM CURRENT USE OF INSULIN (HCC): ICD-10-CM

## 2019-01-18 LAB
CREATININE URINE POCT: 200
MICROALBUMIN/CREAT 24H UR: 10 MG/G{CREAT}
MICROALBUMIN/CREAT UR-RTO: <30

## 2019-01-18 PROCEDURE — 1036F TOBACCO NON-USER: CPT | Performed by: PHYSICIAN ASSISTANT

## 2019-01-18 PROCEDURE — 1123F ACP DISCUSS/DSCN MKR DOCD: CPT | Performed by: PHYSICIAN ASSISTANT

## 2019-01-18 PROCEDURE — 4040F PNEUMOC VAC/ADMIN/RCVD: CPT | Performed by: PHYSICIAN ASSISTANT

## 2019-01-18 PROCEDURE — G8427 DOCREV CUR MEDS BY ELIG CLIN: HCPCS | Performed by: PHYSICIAN ASSISTANT

## 2019-01-18 PROCEDURE — 99214 OFFICE O/P EST MOD 30 MIN: CPT | Performed by: PHYSICIAN ASSISTANT

## 2019-01-18 PROCEDURE — 1090F PRES/ABSN URINE INCON ASSESS: CPT | Performed by: PHYSICIAN ASSISTANT

## 2019-01-18 PROCEDURE — 3017F COLORECTAL CA SCREEN DOC REV: CPT | Performed by: PHYSICIAN ASSISTANT

## 2019-01-18 PROCEDURE — 3046F HEMOGLOBIN A1C LEVEL >9.0%: CPT | Performed by: PHYSICIAN ASSISTANT

## 2019-01-18 PROCEDURE — G8482 FLU IMMUNIZE ORDER/ADMIN: HCPCS | Performed by: PHYSICIAN ASSISTANT

## 2019-01-18 PROCEDURE — G8417 CALC BMI ABV UP PARAM F/U: HCPCS | Performed by: PHYSICIAN ASSISTANT

## 2019-01-18 PROCEDURE — 1101F PT FALLS ASSESS-DOCD LE1/YR: CPT | Performed by: PHYSICIAN ASSISTANT

## 2019-01-18 PROCEDURE — 76882 US LMTD JT/FCL EVL NVASC XTR: CPT

## 2019-01-18 PROCEDURE — 2022F DILAT RTA XM EVC RTNOPTHY: CPT | Performed by: PHYSICIAN ASSISTANT

## 2019-01-18 PROCEDURE — 82044 UR ALBUMIN SEMIQUANTITATIVE: CPT | Performed by: PHYSICIAN ASSISTANT

## 2019-01-18 PROCEDURE — G8400 PT W/DXA NO RESULTS DOC: HCPCS | Performed by: PHYSICIAN ASSISTANT

## 2019-01-18 PROCEDURE — 93971 EXTREMITY STUDY: CPT

## 2019-01-18 RX ORDER — OMEPRAZOLE 40 MG/1
40 CAPSULE, DELAYED RELEASE ORAL DAILY
Qty: 90 CAPSULE | Refills: 1 | Status: SHIPPED | OUTPATIENT
Start: 2019-01-18 | End: 2019-08-29 | Stop reason: SDUPTHER

## 2019-01-18 RX ORDER — DIVALPROEX SODIUM 500 MG/1
500 TABLET, EXTENDED RELEASE ORAL DAILY
Qty: 30 TABLET | Refills: 3 | Status: SHIPPED | OUTPATIENT
Start: 2019-01-18 | End: 2019-07-24 | Stop reason: SDUPTHER

## 2019-01-18 RX ORDER — GABAPENTIN 300 MG/1
300 CAPSULE ORAL 2 TIMES DAILY
Qty: 90 CAPSULE | Refills: 1 | Status: SHIPPED | OUTPATIENT
Start: 2019-01-18 | End: 2019-04-18 | Stop reason: SDUPTHER

## 2019-01-18 RX ORDER — OMEPRAZOLE 40 MG/1
40 CAPSULE, DELAYED RELEASE ORAL DAILY
Qty: 90 CAPSULE | Refills: 1 | Status: CANCELLED | OUTPATIENT
Start: 2019-01-18

## 2019-01-18 RX ORDER — RANITIDINE 300 MG/1
TABLET ORAL
Qty: 90 TABLET | Refills: 3 | Status: SHIPPED | OUTPATIENT
Start: 2019-01-18

## 2019-01-18 RX ORDER — RAMIPRIL 5 MG/1
5 CAPSULE ORAL DAILY
Qty: 30 CAPSULE | Refills: 3 | Status: SHIPPED | OUTPATIENT
Start: 2019-01-18 | End: 2019-01-18 | Stop reason: SDUPTHER

## 2019-01-18 RX ORDER — RAMIPRIL 5 MG/1
5 CAPSULE ORAL DAILY
Qty: 90 CAPSULE | Refills: 3 | Status: SHIPPED | OUTPATIENT
Start: 2019-01-18

## 2019-01-18 RX ORDER — RANITIDINE 300 MG/1
300 TABLET ORAL NIGHTLY
Qty: 30 TABLET | Refills: 3 | Status: SHIPPED | OUTPATIENT
Start: 2019-01-18 | End: 2019-01-18 | Stop reason: SDUPTHER

## 2019-01-21 DIAGNOSIS — L03.115 CELLULITIS OF RIGHT LOWER EXTREMITY: Primary | ICD-10-CM

## 2019-01-21 RX ORDER — CEPHALEXIN 500 MG/1
500 CAPSULE ORAL 3 TIMES DAILY
Qty: 30 CAPSULE | Refills: 0 | Status: SHIPPED | OUTPATIENT
Start: 2019-01-21 | End: 2019-02-07

## 2019-01-21 RX ORDER — DOXYCYCLINE HYCLATE 100 MG
100 TABLET ORAL 2 TIMES DAILY WITH MEALS
Qty: 20 TABLET | Refills: 0 | Status: SHIPPED | OUTPATIENT
Start: 2019-01-21 | End: 2019-01-31

## 2019-02-04 ENCOUNTER — HOSPITAL ENCOUNTER (OUTPATIENT)
Age: 69
Discharge: HOME | End: 2019-02-06
Payer: MEDICARE

## 2019-02-04 ENCOUNTER — OFFICE VISIT (OUTPATIENT)
Dept: FAMILY MEDICINE CLINIC | Age: 69
End: 2019-02-04
Payer: MEDICARE

## 2019-02-04 ENCOUNTER — HOSPITAL ENCOUNTER (OUTPATIENT)
Dept: GENERAL RADIOLOGY | Age: 69
Discharge: HOME | End: 2019-02-06
Payer: MEDICARE

## 2019-02-04 VITALS
DIASTOLIC BLOOD PRESSURE: 75 MMHG | OXYGEN SATURATION: 96 % | SYSTOLIC BLOOD PRESSURE: 124 MMHG | HEART RATE: 61 BPM | RESPIRATION RATE: 16 BRPM | TEMPERATURE: 98.3 F | BODY MASS INDEX: 33.03 KG/M2 | WEIGHT: 180.6 LBS

## 2019-02-04 DIAGNOSIS — R22.41 LEG MASS, RIGHT: Primary | ICD-10-CM

## 2019-02-04 DIAGNOSIS — R22.41 LEG MASS, RIGHT: ICD-10-CM

## 2019-02-04 PROCEDURE — 1036F TOBACCO NON-USER: CPT | Performed by: PHYSICIAN ASSISTANT

## 2019-02-04 PROCEDURE — G8400 PT W/DXA NO RESULTS DOC: HCPCS | Performed by: PHYSICIAN ASSISTANT

## 2019-02-04 PROCEDURE — G8417 CALC BMI ABV UP PARAM F/U: HCPCS | Performed by: PHYSICIAN ASSISTANT

## 2019-02-04 PROCEDURE — 1101F PT FALLS ASSESS-DOCD LE1/YR: CPT | Performed by: PHYSICIAN ASSISTANT

## 2019-02-04 PROCEDURE — 1090F PRES/ABSN URINE INCON ASSESS: CPT | Performed by: PHYSICIAN ASSISTANT

## 2019-02-04 PROCEDURE — 3017F COLORECTAL CA SCREEN DOC REV: CPT | Performed by: PHYSICIAN ASSISTANT

## 2019-02-04 PROCEDURE — 73590 X-RAY EXAM OF LOWER LEG: CPT

## 2019-02-04 PROCEDURE — 4040F PNEUMOC VAC/ADMIN/RCVD: CPT | Performed by: PHYSICIAN ASSISTANT

## 2019-02-04 PROCEDURE — 99213 OFFICE O/P EST LOW 20 MIN: CPT | Performed by: PHYSICIAN ASSISTANT

## 2019-02-04 PROCEDURE — G8427 DOCREV CUR MEDS BY ELIG CLIN: HCPCS | Performed by: PHYSICIAN ASSISTANT

## 2019-02-04 PROCEDURE — 1123F ACP DISCUSS/DSCN MKR DOCD: CPT | Performed by: PHYSICIAN ASSISTANT

## 2019-02-04 PROCEDURE — G8482 FLU IMMUNIZE ORDER/ADMIN: HCPCS | Performed by: PHYSICIAN ASSISTANT

## 2019-02-07 ENCOUNTER — OFFICE VISIT (OUTPATIENT)
Dept: CARDIOLOGY CLINIC | Age: 69
End: 2019-02-07
Payer: MEDICARE

## 2019-02-07 VITALS
RESPIRATION RATE: 16 BRPM | DIASTOLIC BLOOD PRESSURE: 62 MMHG | SYSTOLIC BLOOD PRESSURE: 122 MMHG | BODY MASS INDEX: 32.83 KG/M2 | WEIGHT: 178.4 LBS | HEIGHT: 62 IN | HEART RATE: 67 BPM

## 2019-02-07 DIAGNOSIS — E11.42 DIABETIC PERIPHERAL NEUROPATHY (HCC): ICD-10-CM

## 2019-02-07 DIAGNOSIS — E11.69 HYPERLIPIDEMIA ASSOCIATED WITH TYPE 2 DIABETES MELLITUS (HCC): ICD-10-CM

## 2019-02-07 DIAGNOSIS — I10 ESSENTIAL HYPERTENSION: ICD-10-CM

## 2019-02-07 DIAGNOSIS — Z01.818 PRE-OPERATIVE CLEARANCE: Primary | ICD-10-CM

## 2019-02-07 DIAGNOSIS — I25.10 CORONARY ARTERY DISEASE INVOLVING NATIVE HEART WITHOUT ANGINA PECTORIS, UNSPECIFIED VESSEL OR LESION TYPE: ICD-10-CM

## 2019-02-07 DIAGNOSIS — Z79.4 TYPE 2 DIABETES MELLITUS WITHOUT COMPLICATION, WITH LONG-TERM CURRENT USE OF INSULIN (HCC): ICD-10-CM

## 2019-02-07 DIAGNOSIS — E11.9 TYPE 2 DIABETES MELLITUS WITHOUT COMPLICATION, WITH LONG-TERM CURRENT USE OF INSULIN (HCC): ICD-10-CM

## 2019-02-07 DIAGNOSIS — E78.5 HYPERLIPIDEMIA ASSOCIATED WITH TYPE 2 DIABETES MELLITUS (HCC): ICD-10-CM

## 2019-02-07 PROCEDURE — 1036F TOBACCO NON-USER: CPT | Performed by: INTERNAL MEDICINE

## 2019-02-07 PROCEDURE — 2022F DILAT RTA XM EVC RTNOPTHY: CPT | Performed by: INTERNAL MEDICINE

## 2019-02-07 PROCEDURE — 3017F COLORECTAL CA SCREEN DOC REV: CPT | Performed by: INTERNAL MEDICINE

## 2019-02-07 PROCEDURE — 4040F PNEUMOC VAC/ADMIN/RCVD: CPT | Performed by: INTERNAL MEDICINE

## 2019-02-07 PROCEDURE — G8482 FLU IMMUNIZE ORDER/ADMIN: HCPCS | Performed by: INTERNAL MEDICINE

## 2019-02-07 PROCEDURE — 1090F PRES/ABSN URINE INCON ASSESS: CPT | Performed by: INTERNAL MEDICINE

## 2019-02-07 PROCEDURE — G8400 PT W/DXA NO RESULTS DOC: HCPCS | Performed by: INTERNAL MEDICINE

## 2019-02-07 PROCEDURE — G8427 DOCREV CUR MEDS BY ELIG CLIN: HCPCS | Performed by: INTERNAL MEDICINE

## 2019-02-07 PROCEDURE — 93000 ELECTROCARDIOGRAM COMPLETE: CPT | Performed by: INTERNAL MEDICINE

## 2019-02-07 PROCEDURE — G8417 CALC BMI ABV UP PARAM F/U: HCPCS | Performed by: INTERNAL MEDICINE

## 2019-02-07 PROCEDURE — 99204 OFFICE O/P NEW MOD 45 MIN: CPT | Performed by: INTERNAL MEDICINE

## 2019-02-07 PROCEDURE — G8598 ASA/ANTIPLAT THER USED: HCPCS | Performed by: INTERNAL MEDICINE

## 2019-02-07 PROCEDURE — 1123F ACP DISCUSS/DSCN MKR DOCD: CPT | Performed by: INTERNAL MEDICINE

## 2019-02-07 PROCEDURE — 1101F PT FALLS ASSESS-DOCD LE1/YR: CPT | Performed by: INTERNAL MEDICINE

## 2019-02-07 PROCEDURE — 3046F HEMOGLOBIN A1C LEVEL >9.0%: CPT | Performed by: INTERNAL MEDICINE

## 2019-02-15 ENCOUNTER — HOSPITAL ENCOUNTER (OUTPATIENT)
Dept: CARDIOLOGY | Age: 69
Discharge: HOME | End: 2019-02-15
Payer: MEDICARE

## 2019-02-15 VITALS
WEIGHT: 178 LBS | HEIGHT: 62 IN | DIASTOLIC BLOOD PRESSURE: 70 MMHG | BODY MASS INDEX: 32.76 KG/M2 | HEART RATE: 102 BPM | SYSTOLIC BLOOD PRESSURE: 136 MMHG

## 2019-02-15 DIAGNOSIS — Z79.4 TYPE 2 DIABETES MELLITUS WITHOUT COMPLICATION, WITH LONG-TERM CURRENT USE OF INSULIN (HCC): ICD-10-CM

## 2019-02-15 DIAGNOSIS — I25.10 CORONARY ARTERY DISEASE INVOLVING NATIVE HEART WITHOUT ANGINA PECTORIS, UNSPECIFIED VESSEL OR LESION TYPE: Primary | ICD-10-CM

## 2019-02-15 DIAGNOSIS — I10 ESSENTIAL HYPERTENSION: ICD-10-CM

## 2019-02-15 DIAGNOSIS — E11.42 DIABETIC PERIPHERAL NEUROPATHY (HCC): ICD-10-CM

## 2019-02-15 DIAGNOSIS — E11.69 HYPERLIPIDEMIA ASSOCIATED WITH TYPE 2 DIABETES MELLITUS (HCC): ICD-10-CM

## 2019-02-15 DIAGNOSIS — Z01.818 PRE-OPERATIVE CLEARANCE: ICD-10-CM

## 2019-02-15 DIAGNOSIS — E78.5 HYPERLIPIDEMIA ASSOCIATED WITH TYPE 2 DIABETES MELLITUS (HCC): ICD-10-CM

## 2019-02-15 DIAGNOSIS — E11.9 TYPE 2 DIABETES MELLITUS WITHOUT COMPLICATION, WITH LONG-TERM CURRENT USE OF INSULIN (HCC): ICD-10-CM

## 2019-02-15 PROCEDURE — 3430000000 HC RX DIAGNOSTIC RADIOPHARMACEUTICAL: Performed by: INTERNAL MEDICINE

## 2019-02-15 PROCEDURE — 78452 HT MUSCLE IMAGE SPECT MULT: CPT

## 2019-02-15 PROCEDURE — A9500 TC99M SESTAMIBI: HCPCS | Performed by: INTERNAL MEDICINE

## 2019-02-15 PROCEDURE — 6360000002 HC RX W HCPCS: Performed by: INTERNAL MEDICINE

## 2019-02-15 PROCEDURE — 2580000003 HC RX 258: Performed by: INTERNAL MEDICINE

## 2019-02-15 PROCEDURE — 93017 CV STRESS TEST TRACING ONLY: CPT

## 2019-02-15 RX ORDER — SODIUM CHLORIDE 0.9 % (FLUSH) 0.9 %
10 SYRINGE (ML) INJECTION PRN
Status: DISCONTINUED | OUTPATIENT
Start: 2019-02-15 | End: 2019-02-16 | Stop reason: HOSPADM

## 2019-02-15 RX ADMIN — Medication 31.8 MILLICURIE: at 07:55

## 2019-02-15 RX ADMIN — REGADENOSON 0.4 MG: 0.08 INJECTION, SOLUTION INTRAVENOUS at 07:55

## 2019-02-15 RX ADMIN — Medication 10 ML: at 07:55

## 2019-02-15 RX ADMIN — Medication 10.2 MILLICURIE: at 06:50

## 2019-02-15 RX ADMIN — Medication 10 ML: at 06:51

## 2019-02-15 RX ADMIN — Medication 10 ML: at 07:56

## 2019-02-20 ENCOUNTER — TELEPHONE (OUTPATIENT)
Dept: FAMILY MEDICINE CLINIC | Age: 69
End: 2019-02-20

## 2019-02-21 ENCOUNTER — TELEPHONE (OUTPATIENT)
Dept: ORTHOPEDIC SURGERY | Age: 69
End: 2019-02-21

## 2019-02-25 DIAGNOSIS — M17.11 PRIMARY OSTEOARTHRITIS OF RIGHT KNEE: Primary | ICD-10-CM

## 2019-03-05 ENCOUNTER — HOSPITAL ENCOUNTER (OUTPATIENT)
Dept: PREADMISSION TESTING | Age: 69
Discharge: HOME | End: 2019-03-05

## 2019-03-07 ENCOUNTER — HOSPITAL ENCOUNTER (OUTPATIENT)
Dept: CT IMAGING | Age: 69
Discharge: HOME | End: 2019-03-09
Payer: MEDICARE

## 2019-03-07 ENCOUNTER — ANESTHESIA EVENT (OUTPATIENT)
Dept: OPERATING ROOM | Age: 69
DRG: 470 | End: 2019-03-07
Payer: MEDICARE

## 2019-03-07 DIAGNOSIS — M17.11 PRIMARY OSTEOARTHRITIS OF RIGHT KNEE: ICD-10-CM

## 2019-03-07 PROCEDURE — 73700 CT LOWER EXTREMITY W/O DYE: CPT

## 2019-03-07 ASSESSMENT — KOOS JR
GOING UP OR DOWN STAIRS: 3
BENDING TO THE FLOOR TO PICK UP OBJECT: 3
HOW SEVERE IS YOUR KNEE STIFFNESS AFTER FIRST WAKING IN MORNING: 2
STANDING UPRIGHT: 2
RISING FROM SITTING: 2
STRAIGHTENING KNEE FULLY: 2
TWISING OR PIVOTING ON KNEE: 2

## 2019-03-08 ENCOUNTER — OFFICE VISIT (OUTPATIENT)
Dept: ORTHOPEDIC SURGERY | Age: 69
End: 2019-03-08
Payer: MEDICARE

## 2019-03-08 VITALS
BODY MASS INDEX: 33.49 KG/M2 | SYSTOLIC BLOOD PRESSURE: 150 MMHG | TEMPERATURE: 99 F | HEART RATE: 58 BPM | DIASTOLIC BLOOD PRESSURE: 66 MMHG | WEIGHT: 182 LBS | HEIGHT: 62 IN

## 2019-03-08 DIAGNOSIS — M17.11 PRIMARY OSTEOARTHRITIS OF RIGHT KNEE: Primary | ICD-10-CM

## 2019-03-08 PROCEDURE — G8400 PT W/DXA NO RESULTS DOC: HCPCS | Performed by: ORTHOPAEDIC SURGERY

## 2019-03-08 PROCEDURE — 1123F ACP DISCUSS/DSCN MKR DOCD: CPT | Performed by: ORTHOPAEDIC SURGERY

## 2019-03-08 PROCEDURE — 1036F TOBACCO NON-USER: CPT | Performed by: ORTHOPAEDIC SURGERY

## 2019-03-08 PROCEDURE — 3017F COLORECTAL CA SCREEN DOC REV: CPT | Performed by: ORTHOPAEDIC SURGERY

## 2019-03-08 PROCEDURE — G8427 DOCREV CUR MEDS BY ELIG CLIN: HCPCS | Performed by: ORTHOPAEDIC SURGERY

## 2019-03-08 PROCEDURE — 99213 OFFICE O/P EST LOW 20 MIN: CPT | Performed by: ORTHOPAEDIC SURGERY

## 2019-03-08 PROCEDURE — G8482 FLU IMMUNIZE ORDER/ADMIN: HCPCS | Performed by: ORTHOPAEDIC SURGERY

## 2019-03-08 PROCEDURE — 1101F PT FALLS ASSESS-DOCD LE1/YR: CPT | Performed by: ORTHOPAEDIC SURGERY

## 2019-03-08 PROCEDURE — 1090F PRES/ABSN URINE INCON ASSESS: CPT | Performed by: ORTHOPAEDIC SURGERY

## 2019-03-08 PROCEDURE — G8417 CALC BMI ABV UP PARAM F/U: HCPCS | Performed by: ORTHOPAEDIC SURGERY

## 2019-03-08 PROCEDURE — 4040F PNEUMOC VAC/ADMIN/RCVD: CPT | Performed by: ORTHOPAEDIC SURGERY

## 2019-03-08 PROCEDURE — G8598 ASA/ANTIPLAT THER USED: HCPCS | Performed by: ORTHOPAEDIC SURGERY

## 2019-03-11 ENCOUNTER — APPOINTMENT (OUTPATIENT)
Dept: GENERAL RADIOLOGY | Age: 69
DRG: 470 | End: 2019-03-11
Attending: ORTHOPAEDIC SURGERY
Payer: MEDICARE

## 2019-03-11 ENCOUNTER — ANESTHESIA (OUTPATIENT)
Dept: OPERATING ROOM | Age: 69
DRG: 470 | End: 2019-03-11
Payer: MEDICARE

## 2019-03-11 ENCOUNTER — HOSPITAL ENCOUNTER (INPATIENT)
Age: 69
LOS: 3 days | Discharge: NURSING, REHAB OR LONG-TERM CARE | DRG: 470 | End: 2019-03-14
Attending: ORTHOPAEDIC SURGERY | Admitting: ORTHOPAEDIC SURGERY
Payer: MEDICARE

## 2019-03-11 VITALS — DIASTOLIC BLOOD PRESSURE: 59 MMHG | OXYGEN SATURATION: 100 % | SYSTOLIC BLOOD PRESSURE: 129 MMHG | TEMPERATURE: 72 F

## 2019-03-11 DIAGNOSIS — M17.9 OSTEOARTHRITIS OF KNEE, UNSPECIFIED LATERALITY, UNSPECIFIED OSTEOARTHRITIS TYPE: Primary | ICD-10-CM

## 2019-03-11 DIAGNOSIS — Z96.651 STATUS POST TOTAL RIGHT KNEE REPLACEMENT: ICD-10-CM

## 2019-03-11 PROBLEM — Z96.659 STATUS POST TOTAL KNEE REPLACEMENT: Status: ACTIVE | Noted: 2019-03-11

## 2019-03-11 LAB
HBA1C MFR BLD: 6.5 % (ref 4–5.6)
HCT VFR BLD CALC: 36.3 % (ref 34–48)
HEMOGLOBIN: 11.5 G/DL (ref 11.5–15.5)
MCH RBC QN AUTO: 29.4 PG (ref 26–35)
MCHC RBC AUTO-ENTMCNC: 31.7 % (ref 32–34.5)
MCV RBC AUTO: 92.8 FL (ref 80–99.9)
METER GLUCOSE: 131 MG/DL (ref 74–99)
METER GLUCOSE: 178 MG/DL (ref 74–99)
METER GLUCOSE: 216 MG/DL (ref 74–99)
METER GLUCOSE: 275 MG/DL (ref 74–99)
PDW BLD-RTO: 13.7 FL (ref 11.5–15)
PLATELET # BLD: 139 E9/L (ref 130–450)
PMV BLD AUTO: 10.3 FL (ref 7–12)
PREALBUMIN: 22 MG/DL (ref 20–40)
RBC # BLD: 3.91 E12/L (ref 3.5–5.5)
WBC # BLD: 4.5 E9/L (ref 4.5–11.5)

## 2019-03-11 PROCEDURE — 3600000015 HC SURGERY LEVEL 5 ADDTL 15MIN: Performed by: ORTHOPAEDIC SURGERY

## 2019-03-11 PROCEDURE — 3700000000 HC ANESTHESIA ATTENDED CARE: Performed by: ORTHOPAEDIC SURGERY

## 2019-03-11 PROCEDURE — 1200000000 HC SEMI PRIVATE

## 2019-03-11 PROCEDURE — 6360000002 HC RX W HCPCS: Performed by: ANESTHESIOLOGY

## 2019-03-11 PROCEDURE — 8E0Y0CZ ROBOTIC ASSISTED PROCEDURE OF LOWER EXTREMITY, OPEN APPROACH: ICD-10-PCS | Performed by: ORTHOPAEDIC SURGERY

## 2019-03-11 PROCEDURE — 2700000000 HC OXYGEN THERAPY PER DAY

## 2019-03-11 PROCEDURE — 2500000003 HC RX 250 WO HCPCS: Performed by: ORTHOPAEDIC SURGERY

## 2019-03-11 PROCEDURE — 7100000000 HC PACU RECOVERY - FIRST 15 MIN: Performed by: ORTHOPAEDIC SURGERY

## 2019-03-11 PROCEDURE — 3600000005 HC SURGERY LEVEL 5 BASE: Performed by: ORTHOPAEDIC SURGERY

## 2019-03-11 PROCEDURE — 6370000000 HC RX 637 (ALT 250 FOR IP): Performed by: ANESTHESIOLOGY

## 2019-03-11 PROCEDURE — 2580000003 HC RX 258: Performed by: ORTHOPAEDIC SURGERY

## 2019-03-11 PROCEDURE — 6360000002 HC RX W HCPCS: Performed by: ORTHOPAEDIC SURGERY

## 2019-03-11 PROCEDURE — 82962 GLUCOSE BLOOD TEST: CPT

## 2019-03-11 PROCEDURE — 85027 COMPLETE CBC AUTOMATED: CPT

## 2019-03-11 PROCEDURE — 2709999900 HC NON-CHARGEABLE SUPPLY: Performed by: ORTHOPAEDIC SURGERY

## 2019-03-11 PROCEDURE — 27447 TOTAL KNEE ARTHROPLASTY: CPT | Performed by: ORTHOPAEDIC SURGERY

## 2019-03-11 PROCEDURE — 83036 HEMOGLOBIN GLYCOSYLATED A1C: CPT

## 2019-03-11 PROCEDURE — 6360000002 HC RX W HCPCS: Performed by: NURSE ANESTHETIST, CERTIFIED REGISTERED

## 2019-03-11 PROCEDURE — 0SRC0J9 REPLACEMENT OF RIGHT KNEE JOINT WITH SYNTHETIC SUBSTITUTE, CEMENTED, OPEN APPROACH: ICD-10-PCS | Performed by: ORTHOPAEDIC SURGERY

## 2019-03-11 PROCEDURE — 88305 TISSUE EXAM BY PATHOLOGIST: CPT

## 2019-03-11 PROCEDURE — 84134 ASSAY OF PREALBUMIN: CPT

## 2019-03-11 PROCEDURE — 6370000000 HC RX 637 (ALT 250 FOR IP): Performed by: ORTHOPAEDIC SURGERY

## 2019-03-11 PROCEDURE — 73560 X-RAY EXAM OF KNEE 1 OR 2: CPT

## 2019-03-11 PROCEDURE — 2500000003 HC RX 250 WO HCPCS: Performed by: ANESTHESIOLOGY

## 2019-03-11 PROCEDURE — 36415 COLL VENOUS BLD VENIPUNCTURE: CPT

## 2019-03-11 PROCEDURE — 76942 ECHO GUIDE FOR BIOPSY: CPT | Performed by: ANESTHESIOLOGY

## 2019-03-11 PROCEDURE — 7100000001 HC PACU RECOVERY - ADDTL 15 MIN: Performed by: ORTHOPAEDIC SURGERY

## 2019-03-11 PROCEDURE — 88311 DECALCIFY TISSUE: CPT

## 2019-03-11 PROCEDURE — 3700000001 HC ADD 15 MINUTES (ANESTHESIA): Performed by: ORTHOPAEDIC SURGERY

## 2019-03-11 PROCEDURE — C1776 JOINT DEVICE (IMPLANTABLE): HCPCS | Performed by: ORTHOPAEDIC SURGERY

## 2019-03-11 PROCEDURE — 3E0T3BZ INTRODUCTION OF ANESTHETIC AGENT INTO PERIPHERAL NERVES AND PLEXI, PERCUTANEOUS APPROACH: ICD-10-PCS | Performed by: ANESTHESIOLOGY

## 2019-03-11 PROCEDURE — C1713 ANCHOR/SCREW BN/BN,TIS/BN: HCPCS | Performed by: ORTHOPAEDIC SURGERY

## 2019-03-11 PROCEDURE — 2580000003 HC RX 258: Performed by: NURSE ANESTHETIST, CERTIFIED REGISTERED

## 2019-03-11 PROCEDURE — 2720000010 HC SURG SUPPLY STERILE: Performed by: ORTHOPAEDIC SURGERY

## 2019-03-11 PROCEDURE — 2500000003 HC RX 250 WO HCPCS: Performed by: NURSE ANESTHETIST, CERTIFIED REGISTERED

## 2019-03-11 DEVICE — BASEPLATE TIB SZ 3 KNEE TRITANIUM CEM PRI LO PROF TRIATHLON: Type: IMPLANTABLE DEVICE | Site: KNEE | Status: FUNCTIONAL

## 2019-03-11 DEVICE — IMPLANTABLE DEVICE: Type: IMPLANTABLE DEVICE | Site: KNEE | Status: FUNCTIONAL

## 2019-03-11 DEVICE — IMPLANT PAT DIA32MM THK10MM X3 ASYM TRIATHLON: Type: IMPLANTABLE DEVICE | Site: KNEE | Status: FUNCTIONAL

## 2019-03-11 DEVICE — CEMENT BNE RADIOPAQUE FAST SET ACRYL RESIN HI VISC SIMPLEXHV: Type: IMPLANTABLE DEVICE | Site: KNEE | Status: FUNCTIONAL

## 2019-03-11 RX ORDER — CEFAZOLIN SODIUM 2 G/50ML
2 SOLUTION INTRAVENOUS EVERY 8 HOURS
Status: COMPLETED | OUTPATIENT
Start: 2019-03-11 | End: 2019-03-12

## 2019-03-11 RX ORDER — ROPIVACAINE HYDROCHLORIDE 5 MG/ML
INJECTION, SOLUTION EPIDURAL; INFILTRATION; PERINEURAL PRN
Status: DISCONTINUED | OUTPATIENT
Start: 2019-03-11 | End: 2019-03-11 | Stop reason: SDUPTHER

## 2019-03-11 RX ORDER — LIDOCAINE HYDROCHLORIDE 10 MG/ML
INJECTION, SOLUTION INFILTRATION; PERINEURAL PRN
Status: DISCONTINUED | OUTPATIENT
Start: 2019-03-11 | End: 2019-03-11 | Stop reason: SDUPTHER

## 2019-03-11 RX ORDER — FENTANYL CITRATE 50 UG/ML
50 INJECTION, SOLUTION INTRAMUSCULAR; INTRAVENOUS ONCE
Status: DISCONTINUED | OUTPATIENT
Start: 2019-03-11 | End: 2019-03-11 | Stop reason: HOSPADM

## 2019-03-11 RX ORDER — MORPHINE SULFATE 2 MG/ML
4 INJECTION, SOLUTION INTRAMUSCULAR; INTRAVENOUS EVERY 4 HOURS PRN
Status: DISCONTINUED | OUTPATIENT
Start: 2019-03-11 | End: 2019-03-14 | Stop reason: HOSPADM

## 2019-03-11 RX ORDER — DIVALPROEX SODIUM 250 MG/1
500 TABLET, EXTENDED RELEASE ORAL DAILY
Status: DISCONTINUED | OUTPATIENT
Start: 2019-03-11 | End: 2019-03-14 | Stop reason: HOSPADM

## 2019-03-11 RX ORDER — DEXAMETHASONE SODIUM PHOSPHATE 10 MG/ML
INJECTION, SOLUTION INTRAMUSCULAR; INTRAVENOUS PRN
Status: DISCONTINUED | OUTPATIENT
Start: 2019-03-11 | End: 2019-03-11 | Stop reason: SDUPTHER

## 2019-03-11 RX ORDER — MORPHINE SULFATE 2 MG/ML
2 INJECTION, SOLUTION INTRAMUSCULAR; INTRAVENOUS EVERY 4 HOURS PRN
Status: DISCONTINUED | OUTPATIENT
Start: 2019-03-11 | End: 2019-03-14 | Stop reason: HOSPADM

## 2019-03-11 RX ORDER — SODIUM CHLORIDE, SODIUM LACTATE, POTASSIUM CHLORIDE, CALCIUM CHLORIDE 600; 310; 30; 20 MG/100ML; MG/100ML; MG/100ML; MG/100ML
INJECTION, SOLUTION INTRAVENOUS CONTINUOUS PRN
Status: DISCONTINUED | OUTPATIENT
Start: 2019-03-11 | End: 2019-03-11 | Stop reason: SDUPTHER

## 2019-03-11 RX ORDER — SODIUM CHLORIDE 0.9 % (FLUSH) 0.9 %
10 SYRINGE (ML) INJECTION EVERY 12 HOURS SCHEDULED
Status: DISCONTINUED | OUTPATIENT
Start: 2019-03-11 | End: 2019-03-11 | Stop reason: HOSPADM

## 2019-03-11 RX ORDER — FENTANYL CITRATE 50 UG/ML
INJECTION, SOLUTION INTRAMUSCULAR; INTRAVENOUS PRN
Status: DISCONTINUED | OUTPATIENT
Start: 2019-03-11 | End: 2019-03-11 | Stop reason: SDUPTHER

## 2019-03-11 RX ORDER — SODIUM CHLORIDE 0.9 % (FLUSH) 0.9 %
10 SYRINGE (ML) INJECTION PRN
Status: DISCONTINUED | OUTPATIENT
Start: 2019-03-11 | End: 2019-03-11 | Stop reason: HOSPADM

## 2019-03-11 RX ORDER — NICOTINE POLACRILEX 4 MG
15 LOZENGE BUCCAL PRN
Status: DISCONTINUED | OUTPATIENT
Start: 2019-03-11 | End: 2019-03-14 | Stop reason: HOSPADM

## 2019-03-11 RX ORDER — RAMIPRIL 5 MG/1
5 CAPSULE ORAL DAILY
Status: DISCONTINUED | OUTPATIENT
Start: 2019-03-11 | End: 2019-03-14 | Stop reason: HOSPADM

## 2019-03-11 RX ORDER — ACETAMINOPHEN 500 MG
1000 TABLET ORAL ONCE
Status: COMPLETED | OUTPATIENT
Start: 2019-03-11 | End: 2019-03-11

## 2019-03-11 RX ORDER — DOCUSATE SODIUM 100 MG/1
100 CAPSULE, LIQUID FILLED ORAL 2 TIMES DAILY
Status: DISCONTINUED | OUTPATIENT
Start: 2019-03-11 | End: 2019-03-14 | Stop reason: HOSPADM

## 2019-03-11 RX ORDER — ROPIVACAINE HYDROCHLORIDE 5 MG/ML
30 INJECTION, SOLUTION EPIDURAL; INFILTRATION; PERINEURAL ONCE
Status: COMPLETED | OUTPATIENT
Start: 2019-03-11 | End: 2019-03-11

## 2019-03-11 RX ORDER — DEXTROSE MONOHYDRATE 50 MG/ML
100 INJECTION, SOLUTION INTRAVENOUS PRN
Status: DISCONTINUED | OUTPATIENT
Start: 2019-03-11 | End: 2019-03-14 | Stop reason: HOSPADM

## 2019-03-11 RX ORDER — ATORVASTATIN CALCIUM 40 MG/1
40 TABLET, FILM COATED ORAL DAILY
Status: DISCONTINUED | OUTPATIENT
Start: 2019-03-11 | End: 2019-03-14 | Stop reason: HOSPADM

## 2019-03-11 RX ORDER — GABAPENTIN 300 MG/1
300 CAPSULE ORAL 2 TIMES DAILY
Status: DISCONTINUED | OUTPATIENT
Start: 2019-03-11 | End: 2019-03-14 | Stop reason: HOSPADM

## 2019-03-11 RX ORDER — PAROXETINE 10 MG/1
10 TABLET, FILM COATED ORAL EVERY MORNING
Status: DISCONTINUED | OUTPATIENT
Start: 2019-03-12 | End: 2019-03-14 | Stop reason: HOSPADM

## 2019-03-11 RX ORDER — DEXTROSE MONOHYDRATE 25 G/50ML
12.5 INJECTION, SOLUTION INTRAVENOUS PRN
Status: DISCONTINUED | OUTPATIENT
Start: 2019-03-11 | End: 2019-03-14 | Stop reason: HOSPADM

## 2019-03-11 RX ORDER — MIDAZOLAM HYDROCHLORIDE 1 MG/ML
INJECTION INTRAMUSCULAR; INTRAVENOUS PRN
Status: DISCONTINUED | OUTPATIENT
Start: 2019-03-11 | End: 2019-03-11 | Stop reason: SDUPTHER

## 2019-03-11 RX ORDER — SODIUM CHLORIDE 9 MG/ML
INJECTION, SOLUTION INTRAVENOUS CONTINUOUS
Status: DISCONTINUED | OUTPATIENT
Start: 2019-03-11 | End: 2019-03-14 | Stop reason: HOSPADM

## 2019-03-11 RX ORDER — FAMOTIDINE 20 MG/1
40 TABLET, FILM COATED ORAL EVERY EVENING
Status: DISCONTINUED | OUTPATIENT
Start: 2019-03-11 | End: 2019-03-14 | Stop reason: HOSPADM

## 2019-03-11 RX ORDER — PANTOPRAZOLE SODIUM 40 MG/1
40 TABLET, DELAYED RELEASE ORAL
Status: DISCONTINUED | OUTPATIENT
Start: 2019-03-12 | End: 2019-03-14 | Stop reason: HOSPADM

## 2019-03-11 RX ORDER — VANCOMYCIN HYDROCHLORIDE 1 G/20ML
INJECTION, POWDER, LYOPHILIZED, FOR SOLUTION INTRAVENOUS PRN
Status: DISCONTINUED | OUTPATIENT
Start: 2019-03-11 | End: 2019-03-11 | Stop reason: ALTCHOICE

## 2019-03-11 RX ORDER — MIDAZOLAM HYDROCHLORIDE 1 MG/ML
0.5 INJECTION INTRAMUSCULAR; INTRAVENOUS PRN
Status: DISCONTINUED | OUTPATIENT
Start: 2019-03-11 | End: 2019-03-11 | Stop reason: HOSPADM

## 2019-03-11 RX ORDER — PROPOFOL 10 MG/ML
INJECTION, EMULSION INTRAVENOUS CONTINUOUS PRN
Status: DISCONTINUED | OUTPATIENT
Start: 2019-03-11 | End: 2019-03-11 | Stop reason: SDUPTHER

## 2019-03-11 RX ORDER — SODIUM CHLORIDE 9 MG/ML
INJECTION, SOLUTION INTRAVENOUS CONTINUOUS PRN
Status: DISCONTINUED | OUTPATIENT
Start: 2019-03-11 | End: 2019-03-11 | Stop reason: SDUPTHER

## 2019-03-11 RX ORDER — CELECOXIB 100 MG/1
200 CAPSULE ORAL ONCE
Status: COMPLETED | OUTPATIENT
Start: 2019-03-11 | End: 2019-03-11

## 2019-03-11 RX ORDER — SODIUM CHLORIDE 0.9 % (FLUSH) 0.9 %
10 SYRINGE (ML) INJECTION EVERY 12 HOURS SCHEDULED
Status: DISCONTINUED | OUTPATIENT
Start: 2019-03-11 | End: 2019-03-14 | Stop reason: HOSPADM

## 2019-03-11 RX ORDER — INSULIN GLARGINE 100 [IU]/ML
22 INJECTION, SOLUTION SUBCUTANEOUS NIGHTLY
Status: DISCONTINUED | OUTPATIENT
Start: 2019-03-11 | End: 2019-03-14 | Stop reason: HOSPADM

## 2019-03-11 RX ORDER — EPHEDRINE SULFATE/0.9% NACL/PF 50 MG/5 ML
SYRINGE (ML) INTRAVENOUS PRN
Status: DISCONTINUED | OUTPATIENT
Start: 2019-03-11 | End: 2019-03-11 | Stop reason: SDUPTHER

## 2019-03-11 RX ORDER — GABAPENTIN 100 MG/1
200 CAPSULE ORAL ONCE
Status: COMPLETED | OUTPATIENT
Start: 2019-03-11 | End: 2019-03-11

## 2019-03-11 RX ORDER — KETOROLAC TROMETHAMINE 10 MG/1
10 TABLET, FILM COATED ORAL EVERY 6 HOURS PRN
Status: DISPENSED | OUTPATIENT
Start: 2019-03-11 | End: 2019-03-13

## 2019-03-11 RX ORDER — ONDANSETRON 2 MG/ML
4 INJECTION INTRAMUSCULAR; INTRAVENOUS EVERY 6 HOURS PRN
Status: DISCONTINUED | OUTPATIENT
Start: 2019-03-11 | End: 2019-03-14 | Stop reason: HOSPADM

## 2019-03-11 RX ORDER — OXYCODONE HYDROCHLORIDE 5 MG/1
5 TABLET ORAL ONCE
Status: COMPLETED | OUTPATIENT
Start: 2019-03-11 | End: 2019-03-11

## 2019-03-11 RX ORDER — HYDROCODONE BITARTRATE AND ACETAMINOPHEN 5; 325 MG/1; MG/1
2 TABLET ORAL EVERY 4 HOURS PRN
Status: DISCONTINUED | OUTPATIENT
Start: 2019-03-11 | End: 2019-03-14 | Stop reason: HOSPADM

## 2019-03-11 RX ORDER — HYDROCODONE BITARTRATE AND ACETAMINOPHEN 5; 325 MG/1; MG/1
1 TABLET ORAL EVERY 4 HOURS PRN
Status: DISCONTINUED | OUTPATIENT
Start: 2019-03-11 | End: 2019-03-14 | Stop reason: HOSPADM

## 2019-03-11 RX ORDER — BUPIVACAINE HYDROCHLORIDE 7.5 MG/ML
INJECTION, SOLUTION INTRASPINAL PRN
Status: DISCONTINUED | OUTPATIENT
Start: 2019-03-11 | End: 2019-03-11 | Stop reason: SDUPTHER

## 2019-03-11 RX ORDER — LIDOCAINE HYDROCHLORIDE 10 MG/ML
10 INJECTION, SOLUTION INFILTRATION; PERINEURAL
Status: COMPLETED | OUTPATIENT
Start: 2019-03-11 | End: 2019-03-11

## 2019-03-11 RX ORDER — CEFAZOLIN SODIUM 2 G/50ML
2 SOLUTION INTRAVENOUS
Status: COMPLETED | OUTPATIENT
Start: 2019-03-11 | End: 2019-03-11

## 2019-03-11 RX ORDER — DEXAMETHASONE SODIUM PHOSPHATE 10 MG/ML
4 INJECTION, SOLUTION INTRAMUSCULAR; INTRAVENOUS ONCE
Status: COMPLETED | OUTPATIENT
Start: 2019-03-11 | End: 2019-03-11

## 2019-03-11 RX ORDER — SODIUM CHLORIDE 0.9 % (FLUSH) 0.9 %
10 SYRINGE (ML) INJECTION PRN
Status: DISCONTINUED | OUTPATIENT
Start: 2019-03-11 | End: 2019-03-14 | Stop reason: HOSPADM

## 2019-03-11 RX ADMIN — Medication 10 MG: at 10:02

## 2019-03-11 RX ADMIN — FAMOTIDINE 40 MG: 20 TABLET ORAL at 18:12

## 2019-03-11 RX ADMIN — TRANEXAMIC ACID 1000 MG: 1 INJECTION, SOLUTION INTRAVENOUS at 12:27

## 2019-03-11 RX ADMIN — FENTANYL CITRATE 25 MCG: 50 INJECTION, SOLUTION INTRAMUSCULAR; INTRAVENOUS at 09:23

## 2019-03-11 RX ADMIN — METOPROLOL TARTRATE 25 MG: 25 TABLET ORAL at 20:46

## 2019-03-11 RX ADMIN — FENTANYL CITRATE 25 MCG: 50 INJECTION, SOLUTION INTRAMUSCULAR; INTRAVENOUS at 09:22

## 2019-03-11 RX ADMIN — RAMIPRIL 5 MG: 5 CAPSULE ORAL at 18:13

## 2019-03-11 RX ADMIN — ACETAMINOPHEN 500 MG: 500 TABLET ORAL at 07:59

## 2019-03-11 RX ADMIN — DEXAMETHASONE SODIUM PHOSPHATE 8 MG: 10 INJECTION, SOLUTION INTRAMUSCULAR; INTRAVENOUS at 09:58

## 2019-03-11 RX ADMIN — Medication 10 MG: at 09:49

## 2019-03-11 RX ADMIN — MIDAZOLAM HYDROCHLORIDE 1 MG: 1 INJECTION, SOLUTION INTRAMUSCULAR; INTRAVENOUS at 11:08

## 2019-03-11 RX ADMIN — FENTANYL CITRATE 50 MCG: 50 INJECTION, SOLUTION INTRAMUSCULAR; INTRAVENOUS at 09:15

## 2019-03-11 RX ADMIN — CEFAZOLIN SODIUM 2 G: 2 SOLUTION INTRAVENOUS at 09:15

## 2019-03-11 RX ADMIN — CELECOXIB 100 MG: 100 CAPSULE ORAL at 08:00

## 2019-03-11 RX ADMIN — ROPIVACAINE HYDROCHLORIDE 30 ML: 5 INJECTION, SOLUTION EPIDURAL; INFILTRATION; PERINEURAL at 08:38

## 2019-03-11 RX ADMIN — INSULIN LISPRO 7 UNITS: 100 INJECTION, SOLUTION INTRAVENOUS; SUBCUTANEOUS at 18:14

## 2019-03-11 RX ADMIN — TRANEXAMIC ACID 1 G: 1 INJECTION, SOLUTION INTRAVENOUS at 09:27

## 2019-03-11 RX ADMIN — PROPOFOL 100 MCG/KG/MIN: 10 INJECTION, EMULSION INTRAVENOUS at 09:30

## 2019-03-11 RX ADMIN — MIDAZOLAM HYDROCHLORIDE 2 MG: 1 INJECTION, SOLUTION INTRAMUSCULAR; INTRAVENOUS at 08:35

## 2019-03-11 RX ADMIN — ATORVASTATIN CALCIUM 40 MG: 40 TABLET, FILM COATED ORAL at 18:12

## 2019-03-11 RX ADMIN — LIDOCAINE HYDROCHLORIDE 2 ML: 10 INJECTION, SOLUTION INFILTRATION; PERINEURAL at 09:30

## 2019-03-11 RX ADMIN — DEXAMETHASONE SODIUM PHOSPHATE 4 MG: 10 INJECTION, SOLUTION INTRAMUSCULAR; INTRAVENOUS at 08:38

## 2019-03-11 RX ADMIN — GABAPENTIN 200 MG: 100 CAPSULE ORAL at 07:59

## 2019-03-11 RX ADMIN — LIDOCAINE HYDROCHLORIDE 5 ML: 10 INJECTION, SOLUTION INFILTRATION; PERINEURAL at 08:36

## 2019-03-11 RX ADMIN — SODIUM CHLORIDE: 9 INJECTION, SOLUTION INTRAVENOUS at 09:15

## 2019-03-11 RX ADMIN — INSULIN LISPRO 3 UNITS: 100 INJECTION, SOLUTION INTRAVENOUS; SUBCUTANEOUS at 20:48

## 2019-03-11 RX ADMIN — OXYCODONE HYDROCHLORIDE 5 MG: 5 TABLET ORAL at 08:00

## 2019-03-11 RX ADMIN — SODIUM CHLORIDE, POTASSIUM CHLORIDE, SODIUM LACTATE AND CALCIUM CHLORIDE: 600; 310; 30; 20 INJECTION, SOLUTION INTRAVENOUS at 10:11

## 2019-03-11 RX ADMIN — BUPIVACAINE HYDROCHLORIDE IN DEXTROSE 1.8 ML: 7.5 INJECTION, SOLUTION SUBARACHNOID at 09:22

## 2019-03-11 RX ADMIN — DIVALPROEX SODIUM 500 MG: 250 TABLET, FILM COATED, EXTENDED RELEASE ORAL at 18:12

## 2019-03-11 RX ADMIN — DOCUSATE SODIUM 100 MG: 100 CAPSULE, LIQUID FILLED ORAL at 20:45

## 2019-03-11 RX ADMIN — LIDOCAINE HYDROCHLORIDE 1 ML: 10 INJECTION, SOLUTION INFILTRATION; PERINEURAL at 08:36

## 2019-03-11 RX ADMIN — Medication 10 MG: at 10:21

## 2019-03-11 RX ADMIN — GABAPENTIN 300 MG: 300 CAPSULE ORAL at 18:12

## 2019-03-11 RX ADMIN — HYDROCODONE BITARTRATE AND ACETAMINOPHEN 2 TABLET: 5; 325 TABLET ORAL at 20:45

## 2019-03-11 RX ADMIN — CEFAZOLIN SODIUM 2 G: 2 SOLUTION INTRAVENOUS at 18:13

## 2019-03-11 RX ADMIN — Medication 10 MG: at 10:48

## 2019-03-11 RX ADMIN — Medication 10 MG: at 10:58

## 2019-03-11 RX ADMIN — INSULIN GLARGINE 22 UNITS: 100 INJECTION, SOLUTION SUBCUTANEOUS at 20:48

## 2019-03-11 ASSESSMENT — PULMONARY FUNCTION TESTS
PIF_VALUE: 0
PIF_VALUE: 1
PIF_VALUE: 0
PIF_VALUE: 1
PIF_VALUE: 0
PIF_VALUE: 1
PIF_VALUE: 0
PIF_VALUE: 1
PIF_VALUE: 0
PIF_VALUE: 1
PIF_VALUE: 0
PIF_VALUE: 1
PIF_VALUE: 0
PIF_VALUE: 1
PIF_VALUE: 0
PIF_VALUE: 1
PIF_VALUE: 0
PIF_VALUE: 1
PIF_VALUE: 0
PIF_VALUE: 1
PIF_VALUE: 0
PIF_VALUE: 1
PIF_VALUE: 0

## 2019-03-11 ASSESSMENT — PAIN DESCRIPTION - PAIN TYPE
TYPE: SURGICAL PAIN

## 2019-03-11 ASSESSMENT — PAIN DESCRIPTION - PROGRESSION: CLINICAL_PROGRESSION: GRADUALLY WORSENING

## 2019-03-11 ASSESSMENT — PAIN DESCRIPTION - ONSET: ONSET: ON-GOING

## 2019-03-11 ASSESSMENT — PAIN SCALES - GENERAL
PAINLEVEL_OUTOF10: 0
PAINLEVEL_OUTOF10: 7
PAINLEVEL_OUTOF10: 0
PAINLEVEL_OUTOF10: 4
PAINLEVEL_OUTOF10: 0
PAINLEVEL_OUTOF10: 5
PAINLEVEL_OUTOF10: 4
PAINLEVEL_OUTOF10: 0
PAINLEVEL_OUTOF10: 0
PAINLEVEL_OUTOF10: 4
PAINLEVEL_OUTOF10: 4

## 2019-03-11 ASSESSMENT — PAIN DESCRIPTION - DESCRIPTORS: DESCRIPTORS: ACHING;DISCOMFORT;DULL

## 2019-03-11 ASSESSMENT — PAIN DESCRIPTION - ORIENTATION: ORIENTATION: RIGHT

## 2019-03-11 ASSESSMENT — PAIN DESCRIPTION - FREQUENCY: FREQUENCY: CONTINUOUS

## 2019-03-11 ASSESSMENT — PAIN DESCRIPTION - LOCATION: LOCATION: KNEE

## 2019-03-11 ASSESSMENT — PAIN - FUNCTIONAL ASSESSMENT: PAIN_FUNCTIONAL_ASSESSMENT: PREVENTS OR INTERFERES SOME ACTIVE ACTIVITIES AND ADLS

## 2019-03-12 LAB
HCT VFR BLD CALC: 28.4 % (ref 34–48)
HEMOGLOBIN: 8.9 G/DL (ref 11.5–15.5)
MCH RBC QN AUTO: 29.3 PG (ref 26–35)
MCHC RBC AUTO-ENTMCNC: 31.3 % (ref 32–34.5)
MCV RBC AUTO: 93.4 FL (ref 80–99.9)
METER GLUCOSE: 124 MG/DL (ref 74–99)
METER GLUCOSE: 206 MG/DL (ref 74–99)
METER GLUCOSE: 212 MG/DL (ref 74–99)
METER GLUCOSE: 254 MG/DL (ref 74–99)
PDW BLD-RTO: 13.2 FL (ref 11.5–15)
PLATELET # BLD: 119 E9/L (ref 130–450)
PMV BLD AUTO: 10.8 FL (ref 7–12)
RBC # BLD: 3.04 E12/L (ref 3.5–5.5)
WBC # BLD: 7.8 E9/L (ref 4.5–11.5)

## 2019-03-12 PROCEDURE — 6370000000 HC RX 637 (ALT 250 FOR IP)

## 2019-03-12 PROCEDURE — 97530 THERAPEUTIC ACTIVITIES: CPT

## 2019-03-12 PROCEDURE — 6370000000 HC RX 637 (ALT 250 FOR IP): Performed by: ORTHOPAEDIC SURGERY

## 2019-03-12 PROCEDURE — 2580000003 HC RX 258: Performed by: ORTHOPAEDIC SURGERY

## 2019-03-12 PROCEDURE — 82962 GLUCOSE BLOOD TEST: CPT

## 2019-03-12 PROCEDURE — 97165 OT EVAL LOW COMPLEX 30 MIN: CPT

## 2019-03-12 PROCEDURE — 97110 THERAPEUTIC EXERCISES: CPT

## 2019-03-12 PROCEDURE — 2700000000 HC OXYGEN THERAPY PER DAY

## 2019-03-12 PROCEDURE — 85027 COMPLETE CBC AUTOMATED: CPT

## 2019-03-12 PROCEDURE — 6360000002 HC RX W HCPCS: Performed by: ORTHOPAEDIC SURGERY

## 2019-03-12 PROCEDURE — 36415 COLL VENOUS BLD VENIPUNCTURE: CPT

## 2019-03-12 PROCEDURE — 1200000000 HC SEMI PRIVATE

## 2019-03-12 PROCEDURE — 97161 PT EVAL LOW COMPLEX 20 MIN: CPT

## 2019-03-12 RX ORDER — ASPIRIN 325 MG
TABLET ORAL
Status: COMPLETED
Start: 2019-03-12 | End: 2019-03-12

## 2019-03-12 RX ADMIN — ASPIRIN 325 MG: 325 TABLET, COATED ORAL at 21:59

## 2019-03-12 RX ADMIN — RAMIPRIL 5 MG: 5 CAPSULE ORAL at 08:29

## 2019-03-12 RX ADMIN — GABAPENTIN 300 MG: 300 CAPSULE ORAL at 16:51

## 2019-03-12 RX ADMIN — PANTOPRAZOLE SODIUM 40 MG: 40 TABLET, DELAYED RELEASE ORAL at 06:25

## 2019-03-12 RX ADMIN — FAMOTIDINE 40 MG: 20 TABLET ORAL at 17:27

## 2019-03-12 RX ADMIN — ATORVASTATIN CALCIUM 40 MG: 40 TABLET, FILM COATED ORAL at 08:27

## 2019-03-12 RX ADMIN — METOPROLOL TARTRATE 25 MG: 25 TABLET ORAL at 08:28

## 2019-03-12 RX ADMIN — PAROXETINE 10 MG: 10 TABLET, FILM COATED ORAL at 08:27

## 2019-03-12 RX ADMIN — INSULIN LISPRO 4 UNITS: 100 INJECTION, SOLUTION INTRAVENOUS; SUBCUTANEOUS at 06:26

## 2019-03-12 RX ADMIN — INSULIN LISPRO 7 UNITS: 100 INJECTION, SOLUTION INTRAVENOUS; SUBCUTANEOUS at 06:39

## 2019-03-12 RX ADMIN — HYDROCODONE BITARTRATE AND ACETAMINOPHEN 2 TABLET: 5; 325 TABLET ORAL at 13:07

## 2019-03-12 RX ADMIN — HYDROCODONE BITARTRATE AND ACETAMINOPHEN 2 TABLET: 5; 325 TABLET ORAL at 08:28

## 2019-03-12 RX ADMIN — HYDROCODONE BITARTRATE AND ACETAMINOPHEN 2 TABLET: 5; 325 TABLET ORAL at 21:56

## 2019-03-12 RX ADMIN — DOCUSATE SODIUM 100 MG: 100 CAPSULE, LIQUID FILLED ORAL at 08:28

## 2019-03-12 RX ADMIN — INSULIN LISPRO 7 UNITS: 100 INJECTION, SOLUTION INTRAVENOUS; SUBCUTANEOUS at 13:10

## 2019-03-12 RX ADMIN — DIVALPROEX SODIUM 500 MG: 250 TABLET, FILM COATED, EXTENDED RELEASE ORAL at 08:28

## 2019-03-12 RX ADMIN — ASPIRIN 325 MG: 325 TABLET, DELAYED RELEASE ORAL at 08:27

## 2019-03-12 RX ADMIN — KETOROLAC TROMETHAMINE 10 MG: 10 TABLET, FILM COATED ORAL at 10:24

## 2019-03-12 RX ADMIN — INSULIN GLARGINE 22 UNITS: 100 INJECTION, SOLUTION SUBCUTANEOUS at 22:06

## 2019-03-12 RX ADMIN — DOCUSATE SODIUM 100 MG: 100 CAPSULE, LIQUID FILLED ORAL at 21:56

## 2019-03-12 RX ADMIN — GABAPENTIN 300 MG: 300 CAPSULE ORAL at 08:27

## 2019-03-12 RX ADMIN — METOPROLOL TARTRATE 25 MG: 25 TABLET ORAL at 21:56

## 2019-03-12 RX ADMIN — CEFAZOLIN SODIUM 2 G: 2 SOLUTION INTRAVENOUS at 01:50

## 2019-03-12 RX ADMIN — INSULIN LISPRO 7 UNITS: 100 INJECTION, SOLUTION INTRAVENOUS; SUBCUTANEOUS at 16:53

## 2019-03-12 RX ADMIN — INSULIN LISPRO 6 UNITS: 100 INJECTION, SOLUTION INTRAVENOUS; SUBCUTANEOUS at 13:08

## 2019-03-12 RX ADMIN — Medication 10 ML: at 21:56

## 2019-03-12 RX ADMIN — INSULIN LISPRO 4 UNITS: 100 INJECTION, SOLUTION INTRAVENOUS; SUBCUTANEOUS at 16:51

## 2019-03-12 RX ADMIN — HYDROCODONE BITARTRATE AND ACETAMINOPHEN 2 TABLET: 5; 325 TABLET ORAL at 04:20

## 2019-03-12 ASSESSMENT — PAIN DESCRIPTION - DESCRIPTORS
DESCRIPTORS: ACHING;DISCOMFORT
DESCRIPTORS: ACHING;DISCOMFORT;DULL
DESCRIPTORS: ACHING;DISCOMFORT

## 2019-03-12 ASSESSMENT — PAIN DESCRIPTION - LOCATION
LOCATION: KNEE

## 2019-03-12 ASSESSMENT — PAIN DESCRIPTION - ORIENTATION
ORIENTATION: RIGHT

## 2019-03-12 ASSESSMENT — PAIN - FUNCTIONAL ASSESSMENT
PAIN_FUNCTIONAL_ASSESSMENT: PREVENTS OR INTERFERES SOME ACTIVE ACTIVITIES AND ADLS

## 2019-03-12 ASSESSMENT — PAIN DESCRIPTION - PAIN TYPE
TYPE: SURGICAL PAIN

## 2019-03-12 ASSESSMENT — PAIN SCALES - GENERAL
PAINLEVEL_OUTOF10: 8
PAINLEVEL_OUTOF10: 7
PAINLEVEL_OUTOF10: 4
PAINLEVEL_OUTOF10: 10
PAINLEVEL_OUTOF10: 7
PAINLEVEL_OUTOF10: 0
PAINLEVEL_OUTOF10: 6
PAINLEVEL_OUTOF10: 0

## 2019-03-12 ASSESSMENT — PAIN DESCRIPTION - PROGRESSION: CLINICAL_PROGRESSION: GRADUALLY WORSENING

## 2019-03-12 ASSESSMENT — PAIN DESCRIPTION - FREQUENCY
FREQUENCY: CONTINUOUS
FREQUENCY: CONTINUOUS

## 2019-03-12 ASSESSMENT — PAIN DESCRIPTION - ONSET: ONSET: ON-GOING

## 2019-03-13 LAB
METER GLUCOSE: 112 MG/DL (ref 74–99)
METER GLUCOSE: 136 MG/DL (ref 74–99)
METER GLUCOSE: 141 MG/DL (ref 74–99)
METER GLUCOSE: 149 MG/DL (ref 74–99)

## 2019-03-13 PROCEDURE — 97535 SELF CARE MNGMENT TRAINING: CPT

## 2019-03-13 PROCEDURE — 6370000000 HC RX 637 (ALT 250 FOR IP): Performed by: ORTHOPAEDIC SURGERY

## 2019-03-13 PROCEDURE — 97110 THERAPEUTIC EXERCISES: CPT

## 2019-03-13 PROCEDURE — 1200000000 HC SEMI PRIVATE

## 2019-03-13 PROCEDURE — 2580000003 HC RX 258: Performed by: ORTHOPAEDIC SURGERY

## 2019-03-13 PROCEDURE — 97530 THERAPEUTIC ACTIVITIES: CPT

## 2019-03-13 PROCEDURE — 82962 GLUCOSE BLOOD TEST: CPT

## 2019-03-13 RX ORDER — HYDROCODONE BITARTRATE AND ACETAMINOPHEN 5; 325 MG/1; MG/1
1 TABLET ORAL EVERY 6 HOURS PRN
Qty: 28 TABLET | Refills: 0 | Status: SHIPPED | OUTPATIENT
Start: 2019-03-13 | End: 2019-03-20

## 2019-03-13 RX ADMIN — GABAPENTIN 300 MG: 300 CAPSULE ORAL at 16:38

## 2019-03-13 RX ADMIN — PAROXETINE 10 MG: 10 TABLET, FILM COATED ORAL at 10:13

## 2019-03-13 RX ADMIN — METOPROLOL TARTRATE 25 MG: 25 TABLET ORAL at 10:11

## 2019-03-13 RX ADMIN — Medication 10 ML: at 20:24

## 2019-03-13 RX ADMIN — METOPROLOL TARTRATE 25 MG: 25 TABLET ORAL at 20:38

## 2019-03-13 RX ADMIN — ATORVASTATIN CALCIUM 40 MG: 40 TABLET, FILM COATED ORAL at 10:14

## 2019-03-13 RX ADMIN — HYDROCODONE BITARTRATE AND ACETAMINOPHEN 2 TABLET: 5; 325 TABLET ORAL at 20:38

## 2019-03-13 RX ADMIN — INSULIN LISPRO 7 UNITS: 100 INJECTION, SOLUTION INTRAVENOUS; SUBCUTANEOUS at 08:28

## 2019-03-13 RX ADMIN — PANTOPRAZOLE SODIUM 40 MG: 40 TABLET, DELAYED RELEASE ORAL at 06:02

## 2019-03-13 RX ADMIN — ASPIRIN 325 MG: 325 TABLET, DELAYED RELEASE ORAL at 20:38

## 2019-03-13 RX ADMIN — HYDROCODONE BITARTRATE AND ACETAMINOPHEN 2 TABLET: 5; 325 TABLET ORAL at 06:57

## 2019-03-13 RX ADMIN — DIVALPROEX SODIUM 500 MG: 250 TABLET, FILM COATED, EXTENDED RELEASE ORAL at 10:12

## 2019-03-13 RX ADMIN — DOCUSATE SODIUM 100 MG: 100 CAPSULE, LIQUID FILLED ORAL at 20:38

## 2019-03-13 RX ADMIN — INSULIN LISPRO 2 UNITS: 100 INJECTION, SOLUTION INTRAVENOUS; SUBCUTANEOUS at 06:38

## 2019-03-13 RX ADMIN — INSULIN LISPRO 7 UNITS: 100 INJECTION, SOLUTION INTRAVENOUS; SUBCUTANEOUS at 12:11

## 2019-03-13 RX ADMIN — INSULIN GLARGINE 22 UNITS: 100 INJECTION, SOLUTION SUBCUTANEOUS at 20:38

## 2019-03-13 RX ADMIN — Medication 10 ML: at 10:15

## 2019-03-13 RX ADMIN — FAMOTIDINE 40 MG: 20 TABLET ORAL at 18:37

## 2019-03-13 RX ADMIN — INSULIN LISPRO 7 UNITS: 100 INJECTION, SOLUTION INTRAVENOUS; SUBCUTANEOUS at 16:46

## 2019-03-13 RX ADMIN — RAMIPRIL 5 MG: 5 CAPSULE ORAL at 10:13

## 2019-03-13 RX ADMIN — INSULIN LISPRO 2 UNITS: 100 INJECTION, SOLUTION INTRAVENOUS; SUBCUTANEOUS at 16:45

## 2019-03-13 RX ADMIN — HYDROCODONE BITARTRATE AND ACETAMINOPHEN 1 TABLET: 5; 325 TABLET ORAL at 03:57

## 2019-03-13 RX ADMIN — ASPIRIN 325 MG: 325 TABLET, DELAYED RELEASE ORAL at 10:13

## 2019-03-13 RX ADMIN — GABAPENTIN 300 MG: 300 CAPSULE ORAL at 10:11

## 2019-03-13 RX ADMIN — DOCUSATE SODIUM 100 MG: 100 CAPSULE, LIQUID FILLED ORAL at 10:14

## 2019-03-13 ASSESSMENT — PAIN SCALES - GENERAL
PAINLEVEL_OUTOF10: 0
PAINLEVEL_OUTOF10: 9
PAINLEVEL_OUTOF10: 0
PAINLEVEL_OUTOF10: 0
PAINLEVEL_OUTOF10: 8
PAINLEVEL_OUTOF10: 5
PAINLEVEL_OUTOF10: 5
PAINLEVEL_OUTOF10: 6

## 2019-03-13 ASSESSMENT — PAIN DESCRIPTION - FREQUENCY
FREQUENCY: CONTINUOUS
FREQUENCY: CONTINUOUS
FREQUENCY: INTERMITTENT
FREQUENCY: CONTINUOUS
FREQUENCY: INTERMITTENT

## 2019-03-13 ASSESSMENT — PAIN DESCRIPTION - PROGRESSION
CLINICAL_PROGRESSION: GRADUALLY IMPROVING
CLINICAL_PROGRESSION: GRADUALLY IMPROVING
CLINICAL_PROGRESSION: GRADUALLY WORSENING
CLINICAL_PROGRESSION: GRADUALLY WORSENING

## 2019-03-13 ASSESSMENT — PAIN DESCRIPTION - PAIN TYPE
TYPE: SURGICAL PAIN

## 2019-03-13 ASSESSMENT — PAIN DESCRIPTION - LOCATION
LOCATION: KNEE

## 2019-03-13 ASSESSMENT — PAIN DESCRIPTION - ORIENTATION
ORIENTATION: RIGHT

## 2019-03-13 ASSESSMENT — PAIN DESCRIPTION - ONSET
ONSET: ON-GOING

## 2019-03-13 ASSESSMENT — PAIN DESCRIPTION - DESCRIPTORS
DESCRIPTORS: ACHING;DISCOMFORT;DULL
DESCRIPTORS: ACHING;CONSTANT;DISCOMFORT
DESCRIPTORS: ACHING;CONSTANT;DISCOMFORT
DESCRIPTORS: ACHING;DISCOMFORT;PRESSURE
DESCRIPTORS: ACHING;DISCOMFORT;PRESSURE

## 2019-03-13 ASSESSMENT — PAIN - FUNCTIONAL ASSESSMENT
PAIN_FUNCTIONAL_ASSESSMENT: PREVENTS OR INTERFERES SOME ACTIVE ACTIVITIES AND ADLS
PAIN_FUNCTIONAL_ASSESSMENT: ACTIVITIES ARE NOT PREVENTED
PAIN_FUNCTIONAL_ASSESSMENT: PREVENTS OR INTERFERES SOME ACTIVE ACTIVITIES AND ADLS

## 2019-03-14 VITALS
DIASTOLIC BLOOD PRESSURE: 60 MMHG | OXYGEN SATURATION: 90 % | WEIGHT: 191.7 LBS | HEIGHT: 62 IN | SYSTOLIC BLOOD PRESSURE: 126 MMHG | RESPIRATION RATE: 17 BRPM | BODY MASS INDEX: 35.28 KG/M2 | TEMPERATURE: 98.5 F | HEART RATE: 68 BPM

## 2019-03-14 LAB
METER GLUCOSE: 144 MG/DL (ref 74–99)
METER GLUCOSE: 150 MG/DL (ref 74–99)

## 2019-03-14 PROCEDURE — 2580000003 HC RX 258: Performed by: ORTHOPAEDIC SURGERY

## 2019-03-14 PROCEDURE — 6370000000 HC RX 637 (ALT 250 FOR IP): Performed by: ORTHOPAEDIC SURGERY

## 2019-03-14 PROCEDURE — 82962 GLUCOSE BLOOD TEST: CPT

## 2019-03-14 PROCEDURE — 97530 THERAPEUTIC ACTIVITIES: CPT

## 2019-03-14 PROCEDURE — 99024 POSTOP FOLLOW-UP VISIT: CPT | Performed by: ORTHOPAEDIC SURGERY

## 2019-03-14 PROCEDURE — 2700000000 HC OXYGEN THERAPY PER DAY

## 2019-03-14 RX ADMIN — DIVALPROEX SODIUM 500 MG: 250 TABLET, FILM COATED, EXTENDED RELEASE ORAL at 08:58

## 2019-03-14 RX ADMIN — INSULIN LISPRO 7 UNITS: 100 INJECTION, SOLUTION INTRAVENOUS; SUBCUTANEOUS at 11:55

## 2019-03-14 RX ADMIN — INSULIN LISPRO 2 UNITS: 100 INJECTION, SOLUTION INTRAVENOUS; SUBCUTANEOUS at 06:38

## 2019-03-14 RX ADMIN — DOCUSATE SODIUM 100 MG: 100 CAPSULE, LIQUID FILLED ORAL at 08:58

## 2019-03-14 RX ADMIN — RAMIPRIL 5 MG: 5 CAPSULE ORAL at 08:58

## 2019-03-14 RX ADMIN — HYDROCODONE BITARTRATE AND ACETAMINOPHEN 2 TABLET: 5; 325 TABLET ORAL at 06:34

## 2019-03-14 RX ADMIN — HYDROCODONE BITARTRATE AND ACETAMINOPHEN 2 TABLET: 5; 325 TABLET ORAL at 12:08

## 2019-03-14 RX ADMIN — PANTOPRAZOLE SODIUM 40 MG: 40 TABLET, DELAYED RELEASE ORAL at 06:25

## 2019-03-14 RX ADMIN — ASPIRIN 325 MG: 325 TABLET, DELAYED RELEASE ORAL at 08:58

## 2019-03-14 RX ADMIN — HYDROCODONE BITARTRATE AND ACETAMINOPHEN 2 TABLET: 5; 325 TABLET ORAL at 16:46

## 2019-03-14 RX ADMIN — ATORVASTATIN CALCIUM 40 MG: 40 TABLET, FILM COATED ORAL at 08:58

## 2019-03-14 RX ADMIN — PAROXETINE 10 MG: 10 TABLET, FILM COATED ORAL at 08:58

## 2019-03-14 RX ADMIN — Medication 10 ML: at 08:58

## 2019-03-14 RX ADMIN — METOPROLOL TARTRATE 25 MG: 25 TABLET ORAL at 08:58

## 2019-03-14 RX ADMIN — GABAPENTIN 300 MG: 300 CAPSULE ORAL at 08:58

## 2019-03-14 RX ADMIN — INSULIN LISPRO 7 UNITS: 100 INJECTION, SOLUTION INTRAVENOUS; SUBCUTANEOUS at 06:38

## 2019-03-14 RX ADMIN — INSULIN LISPRO 2 UNITS: 100 INJECTION, SOLUTION INTRAVENOUS; SUBCUTANEOUS at 11:54

## 2019-03-14 ASSESSMENT — PAIN DESCRIPTION - PAIN TYPE
TYPE: SURGICAL PAIN

## 2019-03-14 ASSESSMENT — PAIN - FUNCTIONAL ASSESSMENT
PAIN_FUNCTIONAL_ASSESSMENT: PREVENTS OR INTERFERES WITH MANY ACTIVE NOT PASSIVE ACTIVITIES
PAIN_FUNCTIONAL_ASSESSMENT: PREVENTS OR INTERFERES SOME ACTIVE ACTIVITIES AND ADLS
PAIN_FUNCTIONAL_ASSESSMENT: PREVENTS OR INTERFERES SOME ACTIVE ACTIVITIES AND ADLS

## 2019-03-14 ASSESSMENT — PAIN DESCRIPTION - ORIENTATION
ORIENTATION: RIGHT

## 2019-03-14 ASSESSMENT — PAIN DESCRIPTION - PROGRESSION
CLINICAL_PROGRESSION: GRADUALLY WORSENING

## 2019-03-14 ASSESSMENT — PAIN SCALES - GENERAL
PAINLEVEL_OUTOF10: 5
PAINLEVEL_OUTOF10: 7
PAINLEVEL_OUTOF10: 3

## 2019-03-14 ASSESSMENT — PAIN DESCRIPTION - ONSET
ONSET: GRADUAL
ONSET: ON-GOING
ONSET: ON-GOING

## 2019-03-14 ASSESSMENT — PAIN DESCRIPTION - FREQUENCY
FREQUENCY: CONTINUOUS

## 2019-03-14 ASSESSMENT — PAIN DESCRIPTION - LOCATION
LOCATION: KNEE

## 2019-03-14 ASSESSMENT — PAIN DESCRIPTION - DESCRIPTORS
DESCRIPTORS: ACHING;DISCOMFORT
DESCRIPTORS: ACHING;DISCOMFORT;DULL
DESCRIPTORS: ACHING;DISCOMFORT

## 2019-03-18 ENCOUNTER — OFFICE VISIT (OUTPATIENT)
Dept: ORTHOPEDIC SURGERY | Age: 69
End: 2019-03-18

## 2019-03-18 VITALS — HEIGHT: 62 IN | TEMPERATURE: 98.8 F | BODY MASS INDEX: 33.49 KG/M2 | WEIGHT: 182 LBS

## 2019-03-18 DIAGNOSIS — Z96.651 STATUS POST TOTAL RIGHT KNEE REPLACEMENT: ICD-10-CM

## 2019-03-18 DIAGNOSIS — Z96.651 STATUS POST TOTAL RIGHT KNEE REPLACEMENT: Primary | ICD-10-CM

## 2019-03-18 DIAGNOSIS — M17.11 PRIMARY OSTEOARTHRITIS OF RIGHT KNEE: ICD-10-CM

## 2019-03-18 PROCEDURE — 99024 POSTOP FOLLOW-UP VISIT: CPT | Performed by: ORTHOPAEDIC SURGERY

## 2019-04-11 ENCOUNTER — TELEPHONE (OUTPATIENT)
Dept: ADMINISTRATIVE | Age: 69
End: 2019-04-11

## 2019-04-11 NOTE — TELEPHONE ENCOUNTER
Pt's daughter Laura called for appt because pt had knee surgery 1 mo ago, and also is currently on antibiotics for uti. Ksenia Lott has no openings soon, so I told daughter I would leave a message for the office to call her back. Daughter Jessica's phone # is [phone]. Pt speaks Azeri, so please call daughter back.

## 2019-04-12 ENCOUNTER — OFFICE VISIT (OUTPATIENT)
Dept: FAMILY MEDICINE CLINIC | Age: 69
End: 2019-04-12
Payer: MEDICARE

## 2019-04-12 VITALS
OXYGEN SATURATION: 97 % | HEART RATE: 81 BPM | TEMPERATURE: 98.2 F | WEIGHT: 172.8 LBS | HEIGHT: 67 IN | RESPIRATION RATE: 18 BRPM | BODY MASS INDEX: 27.12 KG/M2 | SYSTOLIC BLOOD PRESSURE: 132 MMHG | DIASTOLIC BLOOD PRESSURE: 80 MMHG

## 2019-04-12 DIAGNOSIS — Z96.651 STATUS POST TOTAL RIGHT KNEE REPLACEMENT: Primary | ICD-10-CM

## 2019-04-12 PROCEDURE — 3017F COLORECTAL CA SCREEN DOC REV: CPT | Performed by: PHYSICIAN ASSISTANT

## 2019-04-12 PROCEDURE — 4040F PNEUMOC VAC/ADMIN/RCVD: CPT | Performed by: PHYSICIAN ASSISTANT

## 2019-04-12 PROCEDURE — 1036F TOBACCO NON-USER: CPT | Performed by: PHYSICIAN ASSISTANT

## 2019-04-12 PROCEDURE — 1111F DSCHRG MED/CURRENT MED MERGE: CPT | Performed by: PHYSICIAN ASSISTANT

## 2019-04-12 PROCEDURE — G8417 CALC BMI ABV UP PARAM F/U: HCPCS | Performed by: PHYSICIAN ASSISTANT

## 2019-04-12 PROCEDURE — G8598 ASA/ANTIPLAT THER USED: HCPCS | Performed by: PHYSICIAN ASSISTANT

## 2019-04-12 PROCEDURE — G8427 DOCREV CUR MEDS BY ELIG CLIN: HCPCS | Performed by: PHYSICIAN ASSISTANT

## 2019-04-12 PROCEDURE — 1123F ACP DISCUSS/DSCN MKR DOCD: CPT | Performed by: PHYSICIAN ASSISTANT

## 2019-04-12 PROCEDURE — G8400 PT W/DXA NO RESULTS DOC: HCPCS | Performed by: PHYSICIAN ASSISTANT

## 2019-04-12 PROCEDURE — 99213 OFFICE O/P EST LOW 20 MIN: CPT | Performed by: PHYSICIAN ASSISTANT

## 2019-04-12 PROCEDURE — 1090F PRES/ABSN URINE INCON ASSESS: CPT | Performed by: PHYSICIAN ASSISTANT

## 2019-04-12 RX ORDER — HYDROCODONE BITARTRATE AND ACETAMINOPHEN 5; 325 MG/1; MG/1
1 TABLET ORAL EVERY 4 HOURS PRN
Qty: 30 TABLET | Refills: 0 | Status: SHIPPED | OUTPATIENT
Start: 2019-04-12 | End: 2019-04-19

## 2019-04-12 NOTE — PROGRESS NOTES
[employer]  [address]   [phone]      [address]  1950  4/12/19      HPI:  Patient presents for right knee pain secondary to recent right knee replacement. She saw Dr Edd Velez in f/u. She is to start PT today. She left the rehab facility yesterday and had no pain meds sent home. Her pain has been persistent, no increase. Past Medical History:   Diagnosis Date    Anemia     low iron    CAD (coronary artery disease)     sees Dr. Ousmane Ling GERD (gastroesophageal reflux disease)     History of blood transfusion     Hyperlipidemia     Hypertension     Osteoarthritis     Type 2 diabetes mellitus without complication ([facility] [address]         Past Surgical History:   Procedure Laterality Date    CARDIAC SURGERY  2015    CAD, stents placed     CATARACT REMOVAL      bilateral    COLONOSCOPY      ENDOSCOPY, COLON, DIAGNOSTIC      HYSTERECTOMY      KNEE SURGERY Right 2006    right knee surgery    TOTAL KNEE ARTHROPLASTY Right 3/11/2019    ROBOTIC ASSISTED RIGHT KNEE TOTAL ARTHROPLASTY ++LENY++    ++ADDUCTOR CANAL BLOCK++IV DYE ALLERGY++ performed by Yani Santos MD at [facility] OR       Current Outpatient Medications   Medication Sig Dispense Refill    HYDROcodone-acetaminophen (NORCO) 5-325 MG per tablet Take 1 tablet by mouth every 4 hours as needed for Pain for up to 7 days. Intended supply: 5 days. Take lowest dose possible to manage pain 30 tablet 0    divalproex (DEPAKOTE ER) 500 MG extended release tablet Take 1 tablet by mouth daily (Patient taking differently: Take 500 mg by mouth daily Instructed to take am of procedure) 30 tablet 3    gabapentin (NEURONTIN) 300 MG capsule Take 1 capsule by mouth 2 times daily for 30 days. . (Patient taking differently: Take 300 mg by mouth 2 times daily.  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[address] [place] [address] resource strain: Not on file    Food insecurity:     Worry: Not on file     Inability: Not on file   CallsFreeCalls needs:     Medical: Not on file     Non-medical: Not on file   Tobacco Use    Smoking status: Former Smoker     Packs/day: 2.00     Years: 20.00     Pack years: 40.00     Types: Cigarettes     Start date: 1961     Last attempt to quit:      Years since quittin.3    Smokeless tobacco: Never Used   Substance and Sexual Activity    Alcohol use: No    Drug use: No    Sexual activity: Not Currently   Lifestyle    Physical activity:     Days per week: Not on file     Minutes per session: Not on file    Stress: Not on file   Relationships    Social connections:     Talks on phone: Not on file     Gets together: Not on file     Attends Judaism service: Not on file     Active member of club or organization: Not on file     Attends meetings of clubs or organizations: Not on file     Relationship status: Not on file    Intimate partner violence:     Fear of current or ex partner: Not on file     Emotionally abused: Not on file     Physically abused: Not on file     Forced sexual activity: Not on file   Other Topics Concern    Not on file   Social History Narrative    Not on file       Review of Systems :  Constitutional: negative for - chills, fever, weight loss, or fatigue  Psychological: negative for - anxiety, depression or suicidal ideation  HEENT: negative for - vision changes, nasal congestion, ear pain or pharyngitis   Respiratory: negative for -  Chest heaviness, cough, shortness of breath, or pleuritic pain  Cardiovascular: negative for - diaphoresis, chest pain, palpitations, or edema   Gastrointestinal: negative for -abdominal pain, change in bowel habits, constipation, diarrhea, or nausea/vomiting  Genito-Urinary: negative for - dysuria, frequency, or nocturia    Neurological: negative for - bowel and bladder control changes, dizziness, or headaches   Dermatological: negative for - dry skin, rash, hair or nail symptoms    Physical Exam:   Vitals:    04/12/19 1118   BP: 132/80   Site: Right Upper Arm   Position: Sitting   Cuff Size: Large Adult   Pulse: 81   Resp: 18   Temp: 98.2 °F (36.8 °C)   SpO2: 97%   Weight: 172 lb 12.8 oz (78.4 kg)   Height: 5' 7\" (1.702 m)     Physical Exam   Constitutional: She is oriented to person, place, and time. She appears well-developed and well-nourished. No distress. HENT:   Head: Normocephalic and atraumatic. Nose: Nose normal.   Mouth/Throat: Oropharynx is clear and moist.   Eyes: Pupils are equal, round, and reactive to light. Conjunctivae and EOM are normal. No scleral icterus. Neck: Normal range of motion. Neck supple. No thyromegaly present. Cardiovascular: Normal rate, regular rhythm, normal heart sounds and intact distal pulses. No murmur heard. Pulmonary/Chest: Effort normal and breath sounds normal. No accessory muscle usage. No respiratory distress. She has no wheezes. Musculoskeletal: Normal range of motion. She exhibits tenderness (right knee, mild swelling, no erythema or bleeding from incision). Neurological: She is alert and oriented to person, place, and time. She has normal reflexes. Skin: Skin is warm and dry. No rash noted. Psychiatric: She has a normal mood and affect. Her speech is normal and behavior is normal.         Assessment/Plan:      was seen today for knee pain.     Diagnoses and all orders for this

## 2019-04-18 DIAGNOSIS — E11.42 DIABETIC PERIPHERAL NEUROPATHY (HCC): ICD-10-CM

## 2019-04-23 RX ORDER — GABAPENTIN 300 MG/1
CAPSULE ORAL
Qty: 60 CAPSULE | Refills: 0 | Status: SHIPPED | OUTPATIENT
Start: 2019-04-23 | End: 2019-06-17 | Stop reason: SDUPTHER

## 2019-05-01 ENCOUNTER — OFFICE VISIT (OUTPATIENT)
Dept: ORTHOPEDIC SURGERY | Age: 69
End: 2019-05-01

## 2019-05-01 VITALS
BODY MASS INDEX: 33.49 KG/M2 | WEIGHT: 182 LBS | HEART RATE: 74 BPM | SYSTOLIC BLOOD PRESSURE: 148 MMHG | DIASTOLIC BLOOD PRESSURE: 74 MMHG | HEIGHT: 62 IN

## 2019-05-01 DIAGNOSIS — Z96.651 STATUS POST TOTAL RIGHT KNEE REPLACEMENT: Primary | ICD-10-CM

## 2019-05-01 DIAGNOSIS — M17.11 PRIMARY OSTEOARTHRITIS OF RIGHT KNEE: ICD-10-CM

## 2019-05-01 PROCEDURE — 99024 POSTOP FOLLOW-UP VISIT: CPT | Performed by: ORTHOPAEDIC SURGERY

## 2019-05-01 NOTE — PROGRESS NOTES
Post Operative Visit     Surgery: [facility] Assisted Right total knee arthroplasty   Date: 3/11/19      Subjective:    Lisa Mayer is here for follow up visit s/p above procedure. She is doing well. She has been progressing with home physical therapy. She is still having some pain but her range of motion is much improved    Controlled Substances Monitoring:        Physical Exam:    Height: 5' 2\" (1.575 m), Weight: 182 lb (82.6 kg)    On exam, incision is healed. There is minimal swelling. Range of motion is 0-90° with no pain. The knee is stable      Imaging: No new imaging today. Previous x-rays reviewed showing good alignment of right total knee replacement    Assessment and Plan:  6 weeks status post right total knee arthroplasty  She is doing well. She was given a prescription for outpatient PT.   We will see her back in 6 weeks with repeat x-rays    Shadi Ashraf MD  Orthopaedic Surgery   5/1/19  10:27 AM

## 2019-05-07 ENCOUNTER — HOSPITAL ENCOUNTER (OUTPATIENT)
Dept: PHYSICAL THERAPY | Age: 69
Setting detail: THERAPIES SERIES
Discharge: HOME | End: 2019-05-07
Payer: MEDICARE

## 2019-05-07 PROCEDURE — 97161 PT EVAL LOW COMPLEX 20 MIN: CPT | Performed by: PHYSICAL THERAPIST

## 2019-05-07 PROCEDURE — 97110 THERAPEUTIC EXERCISES: CPT | Performed by: PHYSICAL THERAPIST

## 2019-05-07 ASSESSMENT — PAIN DESCRIPTION - PAIN TYPE: TYPE: SURGICAL PAIN

## 2019-05-07 ASSESSMENT — PAIN DESCRIPTION - FREQUENCY: FREQUENCY: CONTINUOUS

## 2019-05-07 ASSESSMENT — PAIN DESCRIPTION - LOCATION: LOCATION: KNEE

## 2019-05-07 ASSESSMENT — PAIN SCALES - GENERAL: PAINLEVEL_OUTOF10: 7

## 2019-05-07 ASSESSMENT — PAIN DESCRIPTION - ORIENTATION: ORIENTATION: RIGHT

## 2019-05-07 NOTE — TELEPHONE ENCOUNTER
Requested Prescriptions     Pending Prescriptions Disp Refills    metoprolol tartrate (LOPRESSOR) 25 MG tablet 60 tablet 1     Sig: Take 1 tablet by mouth 2 times daily Instructed to take am of procedure

## 2019-05-07 NOTE — PROGRESS NOTES
Physical Therapy  Initial Assessment  Date: 2019  Patient Name: Brittany Molina  MRN: [id number]  : 1950          Restrictions       Subjective   General  Referring Practitioner: Con Cerda   Diagnosis: s/p R TKA   Follows Commands: Within Functional Limits  PT Visit Information  PT Insurance Information: [employer]  cert dates: 45 to 19  ICD10: R29.91; R26.9  Total # of Visits to Date: 1  Subjective  Subjective: Patient presents to PT with c/o R knee pain s/p R TKA from 2019. Patient underwent rehab ([facility]) then [facility]. Pt discharged from [facility] approx 1 week ago. She states she was doing better however recently twisted knee resulting in increased pain. She is currently ambulating without AD. Limited ROM/strength remain across knee region.    Pain Screening  Patient Currently in Pain: Yes  Pain Assessment  Pain Assessment: 0-10  Pain Level: 7  Pain Type: Surgical pain  Pain Location: Knee  Pain Orientation: Right  Pain Frequency: Continuous  Vital Signs  Patient Currently in Pain: Yes     Objective     Observation/Palpation  Posture: Fair  Palpation: mild pain across anterior aspect R knee   Edema: mild edema across R knee   Scar: incision site: closed- no redness/drainage     AROM RLE (degrees)  RLE General AROM: R hip/ankle- WFL ;  R knee -8* to 95*   AROM LLE (degrees)  LLE AROM : WFL    Strength RLE  Comment: R hip/knee- grossly 4-/5 all planes  Strength LLE  Comment: LLE- WFL         Sensation  Overall Sensation Status: WFL             Ambulation  Ambulation?: Yes  Ambulation 1  Surface: level tile  Device: No Device  Assistance: Independent  Quality of Gait: pt ambulates without AD; limited heel-toe mechanics across RLE  Balance  Sitting - Static: Good  Sitting - Dynamic: Good  Standing - Static: Good;-  Standing - Dynamic: Good;-     Assessment   Conditions Requiring Skilled Therapeutic Intervention  Body structures, Functions, Activity limitations: Decreased functional mobility ;Decreased ROM; Decreased strength; Increased Pain  Assessment: pt presents to PT s/p R TKA; limited AROM/strength across RLE with hindered gait mechanics   Prognosis: Fair;Good  Decision Making: Low Complexity  Patient Education: pt educated on proper gait mechanics   REQUIRES PT FOLLOW UP: Yes         Plan   Plan  Times per week: 2-3x/week x 6 weeks   Current Treatment Recommendations: Strengthening, ROM, Functional Mobility Training, Gait Training, Manual Therapy - Soft Tissue Mobilization, Pain Management, Home Exercise Program, Safety Education & Training, Patient/Caregiver Education & Training, Equipment Evaluation, Education, & procurement, Modalities         Goals  Short term goals  Time Frame for Short term goals: 3 weeks   Short term goal 1: increase AROM of R knee to: < 5* ext to > 105* flexion   Short term goal 2: increase strength of R hip/knee to grossly 4/5 all planes to improve functional gait   Short term goal 3: increase ambulation with pt demonstrating R heel-toe progression without AD   Short term goal 4: decrease pain to < 5/10 across R knee with activity   Long term goals  Time Frame for Long term goals : 6 weeks   Long term goal 1: increase AROM of R knee to: < 3* ext to > 110* flexion   Long term goal 2: increase strength of R hip/knee to grossly 4+/5 all planes to improve functional gait   Long term goal 3: increase ambulation with pt demonstrating proper R heel-toe mechanics without AD   Long term goal 4: decrease pain to < 3/10 across R knee with activity   Long term goal 5: pt demonstrates independence with HEP  Patient Goals   Patient goals : to reduce knee pain       Therapy Time   Individual Concurrent Group Co-treatment   Time In 0815         Time Out 0915         Minutes [address] [facility]   T: [phone]   F: [phone]     If you have any questions or concerns, please don't hesitate to call.   Thank you for your referral.    Physician Signature:________________________________Date:__________________  By signing above, therapists plan is approved by physician. All patients under [employer]   must be signed by physician.

## 2019-05-10 ENCOUNTER — HOSPITAL ENCOUNTER (OUTPATIENT)
Dept: PHYSICAL THERAPY | Age: 69
Setting detail: THERAPIES SERIES
Discharge: HOME | End: 2019-05-10
Payer: MEDICARE

## 2019-05-10 PROCEDURE — 97530 THERAPEUTIC ACTIVITIES: CPT | Performed by: PHYSICAL THERAPIST

## 2019-05-10 PROCEDURE — 97110 THERAPEUTIC EXERCISES: CPT | Performed by: PHYSICAL THERAPIST

## 2019-05-10 NOTE — PROGRESS NOTES
[facility]  Phone: [phone] Fax: [phone]       Physical Therapy Daily Treatment Note  Date:  5/10/2019    Patient Name:  Roseanne Gipson    :  1950  MRN: [id number]    Restrictions/Precautions:    Diagnosis:  S/p R TKA  Treatment Diagnosis:    Insurance/Certification information:  [facility] Medicare  Referring Physician:  Cristino Meléndez of care signed (Y/N):    Visit# / total visits:  -  Pain level: 4/10  Time In:   830   Time Out:   930      Subjective:  Pt presents to PT for initial treatment session    Exercises:  Exercise/Equipment Resistance/Repetitions Other comments               BTE- R knee flexion   [id number]           Heel slides    3x20    Quad sets    2x15    SLR   2x15 3lbs    SAQ   2x15 3lbs           Mini squats      2x10                                                                      Other Therapeutic Activities:      Home Exercise Program:      Manual Treatments:      Modalities:     Timed Code Treatment Minutes: Total Treatment Minutes:      Treatment/Activity Tolerance:  [x] Patient tolerated treatment well [] Patient limited by fatigue  [] Patient limited by pain  [] Patient limited by other medical complications  [] Other: pt presented to PT for initial treatment session. Pt demonstrates good understanding of there ex. Slight limitations remain for both flex/ext of knee.  Gait stable without AD; heel-toe mechanics present      Prognosis: [] Good [] Fair  [] Poor    Patient Requires Follow-up: [] Yes  [] No    Plan:   [x] Continue per plan of care [] Alter current plan (see comments)  [] Plan of care initiated [] Hold pending MD visit [] Discharge  Plan for Next Session:         Electronically signed by:  Yury Silva DPT

## 2019-05-13 ENCOUNTER — HOSPITAL ENCOUNTER (OUTPATIENT)
Dept: PHYSICAL THERAPY | Age: 69
Setting detail: THERAPIES SERIES
Discharge: HOME | End: 2019-05-13
Payer: MEDICARE

## 2019-05-13 PROCEDURE — 97110 THERAPEUTIC EXERCISES: CPT | Performed by: PHYSICAL THERAPIST

## 2019-05-13 PROCEDURE — 97530 THERAPEUTIC ACTIVITIES: CPT | Performed by: PHYSICAL THERAPIST

## 2019-05-13 NOTE — PROGRESS NOTES
[facility]  Phone: [phone] Fax: [phone]       Physical Therapy Daily Treatment Note  Date:  2019    Patient Name:  Doren Siemens    :  1950  MRN: [id number]    Restrictions/Precautions:    Diagnosis:  S/p R TKA  Treatment Diagnosis:    Insurance/Certification information:  [employer]  Referring Physician:  Rae Gardners of care signed (Y/N):    Visit# / total visits:  3/12-  Pain level: 10  Time In:   0830   Time Out:   0920     Subjective:  Pt presents to PT for first of two treatment sessions this week. Pt reports feeling good. No new issues     Exercises:  Exercise/Equipment Resistance/Repetitions Other comments               BTE- R knee flexion   [id number]           Heel slides    3x20    Quad sets    2x15    SLR   2x15 3lbs    SAQ   2x15 3lbs           Mini squats      2x10                                                                      Other Therapeutic Activities:      Home Exercise Program:      Manual Treatments:      Modalities:     Timed Code Treatment Minutes: Total Treatment Minutes:      Treatment/Activity Tolerance:  [x] Patient tolerated treatment well [] Patient limited by fatigue  [] Patient limited by pain  [] Patient limited by other medical complications  [] Other: Pt demonstrates good understanding of there ex. Slight limitations remain for both flex/ext of knee. AROM of R knee measured -5* to 100*.   Gait stable without AD; heel-toe mechanics present      Prognosis: [] Good [] Fair  [] Poor    Patient Requires Follow-up: [] Yes  [] No    Plan:   [x] Continue per plan of care [] Alter current plan (see comments)  [] Plan of care initiated [] Hold pending MD visit [] Discharge  Plan for Next Session:         Electronically signed by:  Elen Vance DPT

## 2019-05-15 NOTE — TELEPHONE ENCOUNTER
Requested Prescriptions     Pending Prescriptions Disp Refills    blood glucose test strips (ACCU-CHEK MISHEL PLUS) strip 100 each 3     Si each by In Vitro route 3 times daily As needed.

## 2019-05-17 ENCOUNTER — HOSPITAL ENCOUNTER (OUTPATIENT)
Dept: PHYSICAL THERAPY | Age: 69
Setting detail: THERAPIES SERIES
Discharge: HOME | End: 2019-05-17
Payer: MEDICARE

## 2019-05-17 PROCEDURE — 97530 THERAPEUTIC ACTIVITIES: CPT | Performed by: PHYSICAL THERAPIST

## 2019-05-17 PROCEDURE — 97110 THERAPEUTIC EXERCISES: CPT | Performed by: PHYSICAL THERAPIST

## 2019-05-17 PROCEDURE — G0283 ELEC STIM OTHER THAN WOUND: HCPCS | Performed by: PHYSICAL THERAPIST

## 2019-05-17 NOTE — PROGRESS NOTES
[facility]  Phone: [phone] Fax: [phone]       Physical Therapy Daily Treatment Note  Date:  2019    Patient Name:  Brigitte Lara    :  1950  MRN: [id number]    Restrictions/Precautions:    Diagnosis:  S/p R TKA  Treatment Diagnosis:    Insurance/Certification information:  [facility] Medicare  Referring Physician:  Marcelle Conway of care signed (Y/N):    Visit# / total visits:  -  Pain level: 5-6/10  Time In:   0830   Time Out:   0940     Subjective:  Pt presents to PT for two of two treatment sessions this week. Pt reports slight increase in knee pain with no known cause. Cont to ambulate without AD    Exercises:  Exercise/Equipment Resistance/Repetitions Other comments               BTE- R knee flexion   [id number]           Heel slides    3x20    Quad sets    2x15    SLR   2x15 3lbs    SAQ   2x15 3lbs           Mini squats      2x10            Step ups    x10ea 6in                                                       Other Therapeutic Activities:      Home Exercise Program:      Manual Treatments:      Modalities:  CP/IFC x 20mins R knee     Timed Code Treatment Minutes: Total Treatment Minutes:      Treatment/Activity Tolerance:  [x] Patient tolerated treatment well [] Patient limited by fatigue  [] Patient limited by pain  [] Patient limited by other medical complications  [] Other: Pt demonstrates good understanding of there ex. AROM improved to -5* to 103*. Strength grossly 4 to 4+/5 across R hip/knee. Gait stable without AD; heel-toe mechanics present. Performed modalities to reduce knee pain.      Prognosis: [] Good [] Fair  [] Poor    Patient Requires Follow-up: [] Yes  [] No    Plan:   [x] Continue per plan of care [] Alter current plan (see comments)  [] Plan of care initiated [] Hold pending MD visit [] Discharge  Plan for Next Session:         Electronically signed by:  Lizandro Valle DPT

## 2019-05-21 ENCOUNTER — HOSPITAL ENCOUNTER (OUTPATIENT)
Dept: PHYSICAL THERAPY | Age: 69
Setting detail: THERAPIES SERIES
Discharge: HOME | End: 2019-05-21
Payer: MEDICARE

## 2019-05-21 PROCEDURE — G0283 ELEC STIM OTHER THAN WOUND: HCPCS | Performed by: PHYSICAL THERAPIST

## 2019-05-21 PROCEDURE — 97530 THERAPEUTIC ACTIVITIES: CPT | Performed by: PHYSICAL THERAPIST

## 2019-05-21 PROCEDURE — 97110 THERAPEUTIC EXERCISES: CPT | Performed by: PHYSICAL THERAPIST

## 2019-05-21 NOTE — PROGRESS NOTES
[facility]  Phone: [phone] Fax: [phone]       Physical Therapy Daily Treatment Note  Date:  2019    Patient Name:  Marita Scott    :  1950  MRN: [id number]    Restrictions/Precautions:    Diagnosis:  S/p R TKA  Treatment Diagnosis:    Insurance/Certification information:  [employer]  Referring Physician:  Nirali Barry of care signed (Y/N):    Visit# / total visits:  -  Pain level: 410  Time In:   0850  Time Out:  1020     Subjective:  Pt presents to PT for first of two treatment sessions this week. Pt reports cont pain across R knee. Unable to sleep due to pain. Exercises:  Exercise/Equipment Resistance/Repetitions Other comments               BTE- R knee flexion   [id number]           Heel slides    3x20    Quad sets    2x15    SLR   2x15 3lbs    SAQ   2x15 3lbs           Mini squats      2x10            Step ups    x10ea 6in                                                       Other Therapeutic Activities:      Home Exercise Program:      Manual Treatments:      Modalities:  CP/IFC x 20mins R knee     Timed Code Treatment Minutes: Total Treatment Minutes:      Treatment/Activity Tolerance:  [x] Patient tolerated treatment well [] Patient limited by fatigue  [] Patient limited by pain  [] Patient limited by other medical complications  [] Other: Pt demonstrates good understanding of there ex. AROM improved to -5* to 102*. Strength grossly 4 to 4+/5 across R hip/knee. Gait stable without AD; heel-toe mechanics were limited today with PT providing VCs to increase toe off.        Prognosis: [] Good [] Fair  [] Poor    Patient Requires Follow-up: [] Yes  [] No    Plan:   [x] Continue per plan of care [] Alter current plan (see comments)  [] Plan of care initiated [] Hold pending MD visit [] Discharge  Plan for Next Session:         Electronically signed by:  Coy Bey DPT

## 2019-05-23 ENCOUNTER — HOSPITAL ENCOUNTER (OUTPATIENT)
Dept: PHYSICAL THERAPY | Age: 69
Setting detail: THERAPIES SERIES
Discharge: HOME | End: 2019-05-23
Payer: MEDICARE

## 2019-05-23 PROCEDURE — 97110 THERAPEUTIC EXERCISES: CPT | Performed by: PHYSICAL THERAPIST

## 2019-05-23 PROCEDURE — G0283 ELEC STIM OTHER THAN WOUND: HCPCS | Performed by: PHYSICAL THERAPIST

## 2019-05-23 PROCEDURE — 97530 THERAPEUTIC ACTIVITIES: CPT | Performed by: PHYSICAL THERAPIST

## 2019-05-23 NOTE — PROGRESS NOTES
[facility]  Phone: [phone] Fax: [phone]       Physical Therapy Daily Treatment Note  Date:  2019    Patient Name:  Gerda Lozano    :  1950  MRN: [id number]    Restrictions/Precautions:    Diagnosis:  S/p R TKA  Treatment Diagnosis:    Insurance/Certification information:  [facility] Medicare  Referring Physician:  Belem Ricardo of care signed (Y/N):    Visit# / total visits:  -  Pain level: 410  Time In:   0930  Time Out:  1040     Subjective:  Pt presents to PT for second of two treatment sessions this week. Pt reports less pain overall upon entering the clinic. Exercises:  Exercise/Equipment Resistance/Repetitions Other comments               BTE- R knee flexion   [id number]    bike   [id number]     Heel slides    3x20    Quad sets    2x15    SLR   2x15 3lbs    SAQ   2x15 3lbs           Mini squats      2x10            Step ups    x10ea 6in                                                       Other Therapeutic Activities:      Home Exercise Program:      Manual Treatments:      Modalities:  CP/IFC x 20mins R knee     Timed Code Treatment Minutes: Total Treatment Minutes:      Treatment/Activity Tolerance:  [x] Patient tolerated treatment well [] Patient limited by fatigue  [] Patient limited by pain  [] Patient limited by other medical complications  [] Other: Pt demonstrates good understanding of there ex. AROM improved to -5* to 105* and 110* PROM flexion. Strength grossly 4 to 4+/5 across R hip/knee. She tolerated today's session c moderate fatigue, but is displaying improvement c gait mechanics. She had minor visible quad lag c SLR activity today as well.     Prognosis: [] Good [] Fair  [] Poor    Patient Requires Follow-up: [] Yes  [] No    Plan:   [x] Continue per plan of care [] Alter current plan (see comments)  [] Plan of care initiated [] Hold pending MD visit [] Discharge  Plan for Next Session:         Electronically signed by: Jessi Ao DPT

## 2019-05-28 ENCOUNTER — HOSPITAL ENCOUNTER (OUTPATIENT)
Dept: PHYSICAL THERAPY | Age: 69
Setting detail: THERAPIES SERIES
Discharge: HOME | End: 2019-05-28
Payer: MEDICARE

## 2019-05-28 PROCEDURE — 97110 THERAPEUTIC EXERCISES: CPT | Performed by: PHYSICAL THERAPIST

## 2019-05-28 PROCEDURE — G0283 ELEC STIM OTHER THAN WOUND: HCPCS | Performed by: PHYSICAL THERAPIST

## 2019-05-28 PROCEDURE — 97530 THERAPEUTIC ACTIVITIES: CPT | Performed by: PHYSICAL THERAPIST

## 2019-05-28 NOTE — PROGRESS NOTES
[facility]  Phone: [phone] Fax: [phone]       Physical Therapy Daily Treatment Note  Date:  2019    Patient Name:  Boris Rudd    :  1950  MRN: [id number]    Restrictions/Precautions:    Diagnosis:  S/p R TKA  Treatment Diagnosis:    Insurance/Certification information:  [facility] Medicare  Referring Physician:  Rosalidna Chandra of care signed (Y/N):    Visit# / total visits:  -  Pain level: 4/10  Time In:   0825  Time Out:  09     Subjective:  Pt presents to PT for first of two treatment sessions this week. Pt reports cont to have constant ache pain in knee. Exercises:  Exercise/Equipment Resistance/Repetitions Other comments               BTE- R knee flexion   [id number]    bike   [id number]     Heel slides    3x20    Quad sets    2x15    SLR   2x15 3lbs    SAQ   2x15 3lbs           Mini squats      2x10            Step ups    x10ea 6in                                                       Other Therapeutic Activities:      Home Exercise Program:      Manual Treatments:      Modalities:  CP/IFC x 20mins R knee     Timed Code Treatment Minutes: Total Treatment Minutes:      Treatment/Activity Tolerance:  [x] Patient tolerated treatment well [] Patient limited by fatigue  [] Patient limited by pain  [] Patient limited by other medical complications  [] Other: Pt demonstrates good understanding of there ex. No measurements today. Strength grossly 4 to 4+/5 across R hip/knee.   Gait remains stable with proper heel-toe mechanics- cont use of modalities for symptom relief     Prognosis: [] Good [] Fair  [] Poor    Patient Requires Follow-up: [] Yes  [] No    Plan:   [x] Continue per plan of care [] Alter current plan (see comments)  [] Plan of care initiated [] Hold pending MD visit [] Discharge  Plan for Next Session:         Electronically signed by:  Layla Johnston DPT

## 2019-05-29 ENCOUNTER — HOSPITAL ENCOUNTER (OUTPATIENT)
Dept: PHYSICAL THERAPY | Age: 69
Setting detail: THERAPIES SERIES
Discharge: HOME | End: 2019-05-29
Payer: MEDICARE

## 2019-05-29 PROCEDURE — G0283 ELEC STIM OTHER THAN WOUND: HCPCS | Performed by: PHYSICAL THERAPIST

## 2019-05-29 PROCEDURE — 97110 THERAPEUTIC EXERCISES: CPT | Performed by: PHYSICAL THERAPIST

## 2019-05-29 PROCEDURE — 97530 THERAPEUTIC ACTIVITIES: CPT | Performed by: PHYSICAL THERAPIST

## 2019-05-29 NOTE — PROGRESS NOTES
[facility]  Phone: [phone] Fax: [phone]       Physical Therapy Daily Treatment Note  Date:  2019    Patient Name:  Lindy Reeder    :  1950  MRN: [id number]    Restrictions/Precautions:    Diagnosis:  S/p R TKA  Treatment Diagnosis:    Insurance/Certification information:  [employer]  Referring Physician:  Steve Fairchild of care signed (Y/N):    Visit# / total visits:  -  Pain level: 2/10  Time In:   0820  Time Out:  0945     Subjective:  Pt presents to PT for two of two treatment sessions this week. Pt reports feeling better with less knee pain. States no pain yesterday however knee cont to feel heavy and tight. Exercises:  Exercise/Equipment Resistance/Repetitions Other comments               BTE- R knee flexion   [id number]    bike   [id number]     Heel slides    2x15    Quad sets    2x15    SLR   2x15 3lbs    SAQ   2x15 3lbs           Mini squats      2x10            Step ups    x10ea 6in                                                       Other Therapeutic Activities:      Home Exercise Program:      Manual Treatments:      Modalities:  CP/IFC x 20mins R knee     Timed Code Treatment Minutes: Total Treatment Minutes:      Treatment/Activity Tolerance:  [x] Patient tolerated treatment well [] Patient limited by fatigue  [] Patient limited by pain  [] Patient limited by other medical complications  [] Other: Pt demonstrates good understanding of there ex. AROM R knee measures -5 to 105*. Strength grossly 4 to 4+/5 across R hip/knee. Gait remains stable with proper heel-toe mechanics without use of AD.  Minimal knee symptoms remain    Prognosis: [] Good [] Fair  [] Poor    Patient Requires Follow-up: [] Yes  [] No    Plan:   [x] Continue per plan of care [] Alter current plan (see comments)  [] Plan of care initiated [] Hold pending MD visit [] Discharge  Plan for Next Session:         Electronically signed by:  Kit Sprague DPT

## 2019-05-31 ENCOUNTER — OFFICE VISIT (OUTPATIENT)
Dept: ORTHOPEDIC SURGERY | Age: 69
End: 2019-05-31

## 2019-05-31 VITALS
SYSTOLIC BLOOD PRESSURE: 126 MMHG | WEIGHT: 182 LBS | DIASTOLIC BLOOD PRESSURE: 66 MMHG | HEIGHT: 62 IN | HEART RATE: 67 BPM | BODY MASS INDEX: 33.49 KG/M2

## 2019-05-31 DIAGNOSIS — M17.11 PRIMARY OSTEOARTHRITIS OF RIGHT KNEE: ICD-10-CM

## 2019-05-31 DIAGNOSIS — Z96.651 STATUS POST TOTAL RIGHT KNEE REPLACEMENT: Primary | ICD-10-CM

## 2019-05-31 DIAGNOSIS — Z96.651 STATUS POST TOTAL RIGHT KNEE REPLACEMENT: ICD-10-CM

## 2019-05-31 PROCEDURE — 3017F COLORECTAL CA SCREEN DOC REV: CPT | Performed by: ORTHOPAEDIC SURGERY

## 2019-05-31 PROCEDURE — 1123F ACP DISCUSS/DSCN MKR DOCD: CPT | Performed by: ORTHOPAEDIC SURGERY

## 2019-05-31 PROCEDURE — 99024 POSTOP FOLLOW-UP VISIT: CPT | Performed by: ORTHOPAEDIC SURGERY

## 2019-05-31 PROCEDURE — G8400 PT W/DXA NO RESULTS DOC: HCPCS | Performed by: ORTHOPAEDIC SURGERY

## 2019-05-31 PROCEDURE — 4040F PNEUMOC VAC/ADMIN/RCVD: CPT | Performed by: ORTHOPAEDIC SURGERY

## 2019-05-31 PROCEDURE — G8417 CALC BMI ABV UP PARAM F/U: HCPCS | Performed by: ORTHOPAEDIC SURGERY

## 2019-05-31 PROCEDURE — G8598 ASA/ANTIPLAT THER USED: HCPCS | Performed by: ORTHOPAEDIC SURGERY

## 2019-05-31 PROCEDURE — G8427 DOCREV CUR MEDS BY ELIG CLIN: HCPCS | Performed by: ORTHOPAEDIC SURGERY

## 2019-05-31 PROCEDURE — 1036F TOBACCO NON-USER: CPT | Performed by: ORTHOPAEDIC SURGERY

## 2019-05-31 PROCEDURE — 1090F PRES/ABSN URINE INCON ASSESS: CPT | Performed by: ORTHOPAEDIC SURGERY

## 2019-05-31 RX ORDER — ASPIRIN 81 MG/1
TABLET, COATED ORAL
Refills: 2 | COMMUNITY
Start: 2019-05-12 | End: 2019-08-08 | Stop reason: SDUPTHER

## 2019-05-31 RX ORDER — IBUPROFEN 400 MG/1
400 TABLET ORAL EVERY 6 HOURS PRN
COMMUNITY
End: 2019-07-24

## 2019-05-31 NOTE — PROGRESS NOTES
Post Operative Visit     Surgery: [facility] Robot Assisted Right total knee arthroplasty   Date: 3/11/19    Subjective:    Alannah Roche is here for follow up visit s/p above procedure. She has been progressing with PT. She is still having a fair amount of pain    Controlled Substances Monitoring:        Physical Exam:    Height: 5' 2\" (1.575 m), Weight: 182 lb (82.6 kg), BP: 126/66    On exam, incision is healed. Range of motion is 0-100°. The knee is stable. There is no swelling      Imaging: X-rays of the knee including 4 weightbearing views of the right knee were obtained. These show good alignment of her right total knee replacement    Impression: Good alignment of right total knee replacement    Assessment and Plan:  She has a little over 6 weeks out from her right [facility] robot assisted total knee  She is doing well. She was reassured today. She will continue to progress with range of motion and strengthening. Follow up in 6 weeks.     Rosanna Calvo MD  Orthopaedic Surgery   5/31/19  9:10 AM

## 2019-06-04 ENCOUNTER — HOSPITAL ENCOUNTER (OUTPATIENT)
Dept: PHYSICAL THERAPY | Age: 69
Setting detail: THERAPIES SERIES
Discharge: HOME | End: 2019-06-04
Payer: MEDICARE

## 2019-06-04 PROCEDURE — G0283 ELEC STIM OTHER THAN WOUND: HCPCS | Performed by: PHYSICAL THERAPIST

## 2019-06-04 PROCEDURE — 97530 THERAPEUTIC ACTIVITIES: CPT | Performed by: PHYSICAL THERAPIST

## 2019-06-04 PROCEDURE — 97110 THERAPEUTIC EXERCISES: CPT | Performed by: PHYSICAL THERAPIST

## 2019-06-04 NOTE — PROGRESS NOTES
[facility]  Phone: [phone] Fax: [phone]       Physical Therapy Daily Treatment Note  Date:  2019    Patient Name:  Gerda Lozano    :  1950  MRN: [id number]    Restrictions/Precautions:    Diagnosis:  S/p R TKA  Treatment Diagnosis:    Insurance/Certification information:  [employer]  Referring Physician:  Belem Ricardo of care signed (Y/N):    Visit# / total visits:  -  Pain level: 3/10  Time In:   0820  Time Out:  0940     Subjective:  Pt presents to PT for first of three treatment sessions this week. Pt reports seeing physician the other day. Told her she needs to increase knee ROM. Exercises:  Exercise/Equipment Resistance/Repetitions Other comments               BTE- R knee flexion   [id number]    bike   [id number]     Heel slides    2x15    Quad sets    2x15    SLR   2x15 3lbs    SAQ   2x15 3lbs           Mini squats      2x10            Step ups    x10ea 6in                                                       Other Therapeutic Activities:      Home Exercise Program:      Manual Treatments:      Modalities:  CP/IFC x 20mins R knee     Timed Code Treatment Minutes: Total Treatment Minutes:      Treatment/Activity Tolerance:  [x] Patient tolerated treatment well [] Patient limited by fatigue  [] Patient limited by pain  [] Patient limited by other medical complications  [] Other: Pt demonstrates good understanding of there ex. AROM R knee cont to show progress. Strength grossly 4 to 4+/5 across R hip/knee. Gait limited slightly today with limited heel-toe mechanics- patient reports pain limiting mechanics today.      Prognosis: [] Good [] Fair  [] Poor    Patient Requires Follow-up: [] Yes  [] No    Plan:   [x] Continue per plan of care [] Alter current plan (see comments)  [] Plan of care initiated [] Hold pending MD visit [] Discharge  Plan for Next Session:         Electronically signed by:  Dahlia Houser DPT

## 2019-06-05 ENCOUNTER — HOSPITAL ENCOUNTER (OUTPATIENT)
Dept: PHYSICAL THERAPY | Age: 69
Setting detail: THERAPIES SERIES
Discharge: HOME | End: 2019-06-05
Payer: MEDICARE

## 2019-06-05 PROCEDURE — G0283 ELEC STIM OTHER THAN WOUND: HCPCS | Performed by: PHYSICAL THERAPIST

## 2019-06-05 PROCEDURE — 97110 THERAPEUTIC EXERCISES: CPT | Performed by: PHYSICAL THERAPIST

## 2019-06-05 PROCEDURE — 97530 THERAPEUTIC ACTIVITIES: CPT | Performed by: PHYSICAL THERAPIST

## 2019-06-05 NOTE — PROGRESS NOTES
[facility]  Phone: [phone] Fax: [phone]       Physical Therapy Daily Treatment Note  Date:  2019    Patient Name:  Bang Lizama    :  1950  MRN: [id number]    Restrictions/Precautions:    Diagnosis:  S/p R TKA  Treatment Diagnosis:    Insurance/Certification information:  [facility] Medicare  Referring Physician:  Randee Felix of care signed (Y/N):    Visit# / total visits:  -  Pain level: 3/10  Time In:   08  Time Out:  930     Subjective:  Pt presents to PT for second of three treatment sessions this week. Pt reports feeling better following therapy yesterday. States ambulation improved with less knee tightness. Exercises:  Exercise/Equipment Resistance/Repetitions Other comments               BTE- R knee flexion   [id number]    bike   [id number]     Heel slides    2x15    Quad sets    2x15    SLR   2x15     SAQ   2x15 3lbs           Mini squats      2x10            Step ups    x10ea 6in           Stair flex/ext str   4x20s ea                                         Other Therapeutic Activities:      Home Exercise Program:      Manual Treatments:      Modalities:  CP/IFC x 20mins R knee     Timed Code Treatment Minutes: Total Treatment Minutes:      Treatment/Activity Tolerance:  [x] Patient tolerated treatment well [] Patient limited by fatigue  [] Patient limited by pain  [] Patient limited by other medical complications  [] Other: Pt demonstrates good understanding of there ex. AROM R knee measured -5 to 105*. PROM measured -3 to 110*. Added stair stretches to promote increased ROM for both flex/ext. Strength grossly 4 to 4+/5 across R hip/knee. Gait mechanics stable today with heel-toe mechanics.      Prognosis: [] Good [] Fair  [] Poor    Patient Requires Follow-up: [] Yes  [] No    Plan:   [x] Continue per plan of care [] Alter current plan (see comments)  [] Plan of care initiated [] Hold pending MD visit [] Discharge  Plan for Next Session:         Electronically signed by:  Sahara Lane DPT

## 2019-06-07 ENCOUNTER — HOSPITAL ENCOUNTER (OUTPATIENT)
Dept: PHYSICAL THERAPY | Age: 69
Setting detail: THERAPIES SERIES
Discharge: HOME | End: 2019-06-07
Payer: MEDICARE

## 2019-06-07 PROCEDURE — 97530 THERAPEUTIC ACTIVITIES: CPT | Performed by: PHYSICAL THERAPIST

## 2019-06-07 PROCEDURE — 97110 THERAPEUTIC EXERCISES: CPT | Performed by: PHYSICAL THERAPIST

## 2019-06-07 PROCEDURE — G0283 ELEC STIM OTHER THAN WOUND: HCPCS | Performed by: PHYSICAL THERAPIST

## 2019-06-10 ENCOUNTER — HOSPITAL ENCOUNTER (OUTPATIENT)
Dept: PHYSICAL THERAPY | Age: 69
Setting detail: THERAPIES SERIES
Discharge: HOME | End: 2019-06-10
Payer: MEDICARE

## 2019-06-10 PROCEDURE — G0283 ELEC STIM OTHER THAN WOUND: HCPCS | Performed by: PHYSICAL THERAPIST

## 2019-06-10 PROCEDURE — 97110 THERAPEUTIC EXERCISES: CPT | Performed by: PHYSICAL THERAPIST

## 2019-06-10 PROCEDURE — 97530 THERAPEUTIC ACTIVITIES: CPT | Performed by: PHYSICAL THERAPIST

## 2019-06-10 NOTE — PROGRESS NOTES
[facility]  Phone: [phone] Fax: [phone]       Physical Therapy Daily Treatment Note  Date:  6/10/2019    Patient Name:  Marita Scott    :  1950  MRN: [id number]    Restrictions/Precautions:    Diagnosis:  S/p R TKA  Treatment Diagnosis:    Insurance/Certification information:  [employer]  Referring Physician:  Nirali Barry of care signed (Y/N):    Visit# / total visits:  -  Pain level: 3/10  Time In:   0825  Time Out:  0940     Subjective:  Pt presents to PT for first of three treatment sessions this week. Pt reports RLE feels heavy today. Exercises:  Exercise/Equipment Resistance/Repetitions Other comments               BTE- R knee flexion   [id number]    bike   [id number]     Heel slides    2x15    Quad sets    2x15    SLR   2x15     SAQ   2x15 4lbs           Mini squats      2x10            Step ups    x10ea 6in           Stair flex/ext str   4x20s ea                                         Other Therapeutic Activities:      Home Exercise Program:      Manual Treatments:      Modalities:  CP/IFC x 20mins R knee     Timed Code Treatment Minutes: Total Treatment Minutes:      Treatment/Activity Tolerance:  [x] Patient tolerated treatment well [] Patient limited by fatigue  [] Patient limited by pain  [] Patient limited by other medical complications  [] Other: Pt demonstrates good understanding of there ex. AROM R knee measured -2 to 105*. PROM remains at 0-110*. Cont there ex to increase ROM for both flex/ext. Strength grossly 4 to 4+/5 across R hip/knee. Gait mechanics are stable today. Proper heel-toe mechanics throughout with no c/o pain with activity.  No pain following session    Prognosis: [] Good [] Fair  [] Poor    Patient Requires Follow-up: [] Yes  [] No    Plan:   [x] Continue per plan of care [] Alter current plan (see comments)  [] Plan of care initiated [] Hold pending MD visit [] Discharge  Plan for Next Session: Electronically signed by:  Kit Sprague DPT

## 2019-06-12 ENCOUNTER — HOSPITAL ENCOUNTER (OUTPATIENT)
Dept: PHYSICAL THERAPY | Age: 69
Setting detail: THERAPIES SERIES
Discharge: HOME | End: 2019-06-12
Payer: MEDICARE

## 2019-06-12 PROCEDURE — G0283 ELEC STIM OTHER THAN WOUND: HCPCS | Performed by: PHYSICAL THERAPIST

## 2019-06-12 PROCEDURE — 97110 THERAPEUTIC EXERCISES: CPT | Performed by: PHYSICAL THERAPIST

## 2019-06-12 PROCEDURE — 97530 THERAPEUTIC ACTIVITIES: CPT | Performed by: PHYSICAL THERAPIST

## 2019-06-12 NOTE — PROGRESS NOTES
[facility]  Phone: [phone] Fax: [phone]       Physical Therapy Daily Treatment Note  Date:  2019    Patient Name:  Javi Lincoln    :  1950  MRN: [id number]    Restrictions/Precautions:    Diagnosis:  S/p R TKA  Treatment Diagnosis:    Insurance/Certification information:  [employer]  Referring Physician:  Camryn Lantigua of care signed (Y/N):    Visit# / total visits:  -  Pain level: 3/10  Time In:   0820  Time Out:  930    Subjective:  Pt presents to PT for second of three treatment sessions this week. Pt reports RLE is feeling better today. Reports walking with better mechanics. Exercises:  Exercise/Equipment Resistance/Repetitions Other comments               BTE- R knee flexion   [id number]    bike   [id number]     Heel slides    2x15    Quad sets    2x15    SLR   2x15     SAQ   2x15 4lbs           Mini squats      2x10            Step ups    x10ea 6in           Stair flex/ext str   4x20s ea                                         Other Therapeutic Activities:      Home Exercise Program:      Manual Treatments:      Modalities:  CP/IFC x 20mins R knee     Timed Code Treatment Minutes: Total Treatment Minutes:      Treatment/Activity Tolerance:  [x] Patient tolerated treatment well [] Patient limited by fatigue  [] Patient limited by pain  [] Patient limited by other medical complications  [] Other: Pt demonstrates good understanding of there ex. No measurements today. Cont there ex to increase ROM for both flex/ext. Strength grossly 4 to 4+/5 across R hip/knee. Gait mechanics are stable today. Proper heel-toe mechanics throughout with no c/o pain with activity. Cont modalities for pain/swelling reduction.  No pain following session    Prognosis: [] Good [] Fair  [] Poor    Patient Requires Follow-up: [] Yes  [] No    Plan:   [x] Continue per plan of care [] Alter current plan (see comments)  [] Plan of care initiated [] Hold pending MD visit [] Discharge  Plan for Next Session:         Electronically signed by:  Coy Bey DPT

## 2019-06-14 ENCOUNTER — HOSPITAL ENCOUNTER (OUTPATIENT)
Dept: PHYSICAL THERAPY | Age: 69
Setting detail: THERAPIES SERIES
Discharge: HOME | End: 2019-06-14
Payer: MEDICARE

## 2019-06-14 PROCEDURE — 97530 THERAPEUTIC ACTIVITIES: CPT | Performed by: PHYSICAL THERAPIST

## 2019-06-14 PROCEDURE — G0283 ELEC STIM OTHER THAN WOUND: HCPCS | Performed by: PHYSICAL THERAPIST

## 2019-06-14 PROCEDURE — 97110 THERAPEUTIC EXERCISES: CPT | Performed by: PHYSICAL THERAPIST

## 2019-06-14 PROCEDURE — 97140 MANUAL THERAPY 1/> REGIONS: CPT | Performed by: PHYSICAL THERAPIST

## 2019-06-14 NOTE — PROGRESS NOTES
[facility]  Phone: [phone] Fax: [phone]       Physical Therapy Daily Treatment Note  Date:  2019    Patient Name:  Alannah Roche    :  1950  MRN: [id number]    Restrictions/Precautions:    Diagnosis:  S/p R TKA  Treatment Diagnosis:    Insurance/Certification information:  [employer]  Referring Physician:  Haroon Tucker of care signed (Y/N):    Visit# / total visits:  -  Pain level: 3/10  Time In:   0820  Time Out:  0940    Subjective:  Pt presents to PT for three of three treatment sessions this week. Pt reports having some increased pain/tightness across knee starting yesterday. No known cause. She reports remaining consistent with HEP   Exercises:  Exercise/Equipment Resistance/Repetitions Other comments               BTE- R knee flexion   [id number]    bike   [id number]     Heel slides    2x15    Quad sets    2x15    SLR   2x15     SAQ   2x15 4lbs           Mini squats      2x10            Step ups    x10ea 6in           Stair flex/ext str   4x20s ea           PROM R knee flex/ext   [id number]  Supine                           Other Therapeutic Activities:      Home Exercise Program:      Manual Treatments:  PROM R knee [id number]     Modalities:  CP/IFC x 20mins R knee     Timed Code Treatment Minutes: Total Treatment Minutes:      Treatment/Activity Tolerance:  [x] Patient tolerated treatment well [] Patient limited by fatigue  [] Patient limited by pain  [] Patient limited by other medical complications  [] Other: Pt demonstrates good understanding of there ex. AROM R knee measured 0-105*. Cont there ex to increase ROM for both flex/ext. Strength grossly 4 to 4+/5 across R hip/knee. Added manual therapy to increase R knee flexion. Gait mechanics are stable today. Proper heel-toe mechanics throughout with no c/o pain with activity. Cont modalities for pain/swelling reduction. Mild swelling remains across knee region.  No pain following session    Prognosis: [] Good [] Fair  [] Poor    Patient Requires Follow-up: [] Yes  [] No    Plan:   [x] Continue per plan of care [] Alter current plan (see comments)  [] Plan of care initiated [] Hold pending MD visit [] Discharge  Plan for Next Session:         Electronically signed by:  Anabel Holman DPT

## 2019-06-17 ENCOUNTER — HOSPITAL ENCOUNTER (OUTPATIENT)
Age: 69
Discharge: HOME | End: 2019-06-19
Payer: MEDICARE

## 2019-06-17 ENCOUNTER — HOSPITAL ENCOUNTER (OUTPATIENT)
Dept: PHYSICAL THERAPY | Age: 69
Setting detail: THERAPIES SERIES
Discharge: HOME | End: 2019-06-17
Payer: MEDICARE

## 2019-06-17 ENCOUNTER — OFFICE VISIT (OUTPATIENT)
Dept: FAMILY MEDICINE CLINIC | Age: 69
End: 2019-06-17
Payer: MEDICARE

## 2019-06-17 VITALS
HEIGHT: 62 IN | HEART RATE: 69 BPM | TEMPERATURE: 98.1 F | WEIGHT: 174 LBS | BODY MASS INDEX: 32.02 KG/M2 | DIASTOLIC BLOOD PRESSURE: 62 MMHG | SYSTOLIC BLOOD PRESSURE: 116 MMHG | OXYGEN SATURATION: 99 % | RESPIRATION RATE: 18 BRPM

## 2019-06-17 DIAGNOSIS — Z79.4 TYPE 2 DIABETES MELLITUS WITHOUT COMPLICATION, WITH LONG-TERM CURRENT USE OF INSULIN (HCC): Primary | ICD-10-CM

## 2019-06-17 DIAGNOSIS — R60.0 LOWER EXTREMITY EDEMA: ICD-10-CM

## 2019-06-17 DIAGNOSIS — E11.9 TYPE 2 DIABETES MELLITUS WITHOUT COMPLICATION, WITH LONG-TERM CURRENT USE OF INSULIN (HCC): Primary | ICD-10-CM

## 2019-06-17 DIAGNOSIS — E11.69 HYPERLIPIDEMIA ASSOCIATED WITH TYPE 2 DIABETES MELLITUS (HCC): ICD-10-CM

## 2019-06-17 DIAGNOSIS — B35.3 TINEA PEDIS OF BOTH FEET: ICD-10-CM

## 2019-06-17 DIAGNOSIS — Z13.820 SCREENING FOR OSTEOPOROSIS: ICD-10-CM

## 2019-06-17 DIAGNOSIS — Z79.899 ENCOUNTER FOR LONG-TERM (CURRENT) DRUG USE: ICD-10-CM

## 2019-06-17 DIAGNOSIS — E11.9 TYPE 2 DIABETES MELLITUS WITHOUT COMPLICATION, WITH LONG-TERM CURRENT USE OF INSULIN (HCC): ICD-10-CM

## 2019-06-17 DIAGNOSIS — E11.42 DIABETIC PERIPHERAL NEUROPATHY (HCC): ICD-10-CM

## 2019-06-17 DIAGNOSIS — E78.5 HYPERLIPIDEMIA ASSOCIATED WITH TYPE 2 DIABETES MELLITUS (HCC): ICD-10-CM

## 2019-06-17 DIAGNOSIS — E55.9 VITAMIN D DEFICIENCY: ICD-10-CM

## 2019-06-17 DIAGNOSIS — M89.9 DISORDER OF BONE: ICD-10-CM

## 2019-06-17 DIAGNOSIS — Z79.4 TYPE 2 DIABETES MELLITUS WITHOUT COMPLICATION, WITH LONG-TERM CURRENT USE OF INSULIN (HCC): ICD-10-CM

## 2019-06-17 DIAGNOSIS — Z12.11 COLON CANCER SCREENING: ICD-10-CM

## 2019-06-17 LAB
AMPHETAMINE SCREEN, URINE: NOT DETECTED
BARBITURATE SCREEN URINE: NOT DETECTED
BASOPHILS ABSOLUTE: 0.03 E9/L (ref 0–0.2)
BASOPHILS RELATIVE PERCENT: 0.5 % (ref 0–2)
BENZODIAZEPINE SCREEN, URINE: NOT DETECTED
CANNABINOID SCREEN URINE: NOT DETECTED
CHOLESTEROL, TOTAL: 145 MG/DL (ref 0–199)
COCAINE METABOLITE SCREEN URINE: NOT DETECTED
EOSINOPHILS ABSOLUTE: 0.21 E9/L (ref 0.05–0.5)
EOSINOPHILS RELATIVE PERCENT: 3.6 % (ref 0–6)
HBA1C MFR BLD: 6.7 %
HCT VFR BLD CALC: 39.9 % (ref 34–48)
HDLC SERPL-MCNC: 49 MG/DL
HEMOGLOBIN: 12.1 G/DL (ref 11.5–15.5)
IMMATURE GRANULOCYTES #: 0.02 E9/L
IMMATURE GRANULOCYTES %: 0.3 % (ref 0–5)
LDL CHOLESTEROL CALCULATED: 63 MG/DL (ref 0–99)
LYMPHOCYTES ABSOLUTE: 1.52 E9/L (ref 1.5–4)
LYMPHOCYTES RELATIVE PERCENT: 26 % (ref 20–42)
MCH RBC QN AUTO: 27.9 PG (ref 26–35)
MCHC RBC AUTO-ENTMCNC: 30.3 % (ref 32–34.5)
MCV RBC AUTO: 91.9 FL (ref 80–99.9)
METHADONE SCREEN, URINE: NOT DETECTED
MONOCYTES ABSOLUTE: 0.44 E9/L (ref 0.1–0.95)
MONOCYTES RELATIVE PERCENT: 7.5 % (ref 2–12)
NEUTROPHILS ABSOLUTE: 3.63 E9/L (ref 1.8–7.3)
NEUTROPHILS RELATIVE PERCENT: 62.1 % (ref 43–80)
OPIATE SCREEN URINE: NOT DETECTED
PDW BLD-RTO: 15 FL (ref 11.5–15)
PHENCYCLIDINE SCREEN URINE: NOT DETECTED
PLATELET # BLD: 212 E9/L (ref 130–450)
PMV BLD AUTO: 12.1 FL (ref 7–12)
PROPOXYPHENE SCREEN: NOT DETECTED
RBC # BLD: 4.34 E12/L (ref 3.5–5.5)
TRIGL SERPL-MCNC: 164 MG/DL (ref 0–149)
VLDLC SERPL CALC-MCNC: 33 MG/DL
WBC # BLD: 5.9 E9/L (ref 4.5–11.5)

## 2019-06-17 PROCEDURE — 3044F HG A1C LEVEL LT 7.0%: CPT | Performed by: FAMILY MEDICINE

## 2019-06-17 PROCEDURE — 1123F ACP DISCUSS/DSCN MKR DOCD: CPT | Performed by: FAMILY MEDICINE

## 2019-06-17 PROCEDURE — 97110 THERAPEUTIC EXERCISES: CPT | Performed by: PHYSICAL THERAPIST

## 2019-06-17 PROCEDURE — 2022F DILAT RTA XM EVC RTNOPTHY: CPT | Performed by: FAMILY MEDICINE

## 2019-06-17 PROCEDURE — 97140 MANUAL THERAPY 1/> REGIONS: CPT | Performed by: PHYSICAL THERAPIST

## 2019-06-17 PROCEDURE — 80307 DRUG TEST PRSMV CHEM ANLYZR: CPT

## 2019-06-17 PROCEDURE — 85025 COMPLETE CBC W/AUTO DIFF WBC: CPT

## 2019-06-17 PROCEDURE — G8400 PT W/DXA NO RESULTS DOC: HCPCS | Performed by: FAMILY MEDICINE

## 2019-06-17 PROCEDURE — 83036 HEMOGLOBIN GLYCOSYLATED A1C: CPT | Performed by: FAMILY MEDICINE

## 2019-06-17 PROCEDURE — G8427 DOCREV CUR MEDS BY ELIG CLIN: HCPCS | Performed by: FAMILY MEDICINE

## 2019-06-17 PROCEDURE — 3017F COLORECTAL CA SCREEN DOC REV: CPT | Performed by: FAMILY MEDICINE

## 2019-06-17 PROCEDURE — 1090F PRES/ABSN URINE INCON ASSESS: CPT | Performed by: FAMILY MEDICINE

## 2019-06-17 PROCEDURE — G8417 CALC BMI ABV UP PARAM F/U: HCPCS | Performed by: FAMILY MEDICINE

## 2019-06-17 PROCEDURE — 1036F TOBACCO NON-USER: CPT | Performed by: FAMILY MEDICINE

## 2019-06-17 PROCEDURE — 97530 THERAPEUTIC ACTIVITIES: CPT | Performed by: PHYSICAL THERAPIST

## 2019-06-17 PROCEDURE — 80061 LIPID PANEL: CPT

## 2019-06-17 PROCEDURE — G8598 ASA/ANTIPLAT THER USED: HCPCS | Performed by: FAMILY MEDICINE

## 2019-06-17 PROCEDURE — 4040F PNEUMOC VAC/ADMIN/RCVD: CPT | Performed by: FAMILY MEDICINE

## 2019-06-17 PROCEDURE — 99214 OFFICE O/P EST MOD 30 MIN: CPT | Performed by: FAMILY MEDICINE

## 2019-06-17 RX ORDER — CLOTRIMAZOLE AND BETAMETHASONE DIPROPIONATE 10; .64 MG/G; MG/G
CREAM TOPICAL
Qty: 1 TUBE | Refills: 1 | Status: SHIPPED | OUTPATIENT
Start: 2019-06-17

## 2019-06-17 RX ORDER — GABAPENTIN 300 MG/1
CAPSULE ORAL
Qty: 60 CAPSULE | Refills: 2 | Status: SHIPPED | OUTPATIENT
Start: 2019-06-17 | End: 2019-08-17

## 2019-06-17 RX ORDER — FUROSEMIDE 20 MG/1
TABLET ORAL
Qty: 30 TABLET | Refills: 2 | Status: SHIPPED | OUTPATIENT
Start: 2019-06-17

## 2019-06-17 ASSESSMENT — ENCOUNTER SYMPTOMS
BACK PAIN: 0
RHINORRHEA: 0
VOMITING: 0
SINUS PRESSURE: 0
DIARRHEA: 0
SORE THROAT: 0
COUGH: 0
CONSTIPATION: 0
SHORTNESS OF BREATH: 0
ABDOMINAL PAIN: 0
NAUSEA: 0
WHEEZING: 0

## 2019-06-17 NOTE — PROGRESS NOTES
[facility]  Phone: [phone] Fax: [phone]       Physical Therapy Daily Treatment Note  Date:  2019    Patient Name:  Yuko Simeon    :  1950  MRN: [id number]    Restrictions/Precautions:    Diagnosis:  S/p R TKA  Treatment Diagnosis:    Insurance/Certification information:  [employer]  Referring Physician:  Amparo Dennis of care signed (Y/N):    Visit# / total visits:  -  Pain level: 310  Time In:   0800  Time Out:  0855    Subjective:  Pt presents to PT for first of three treatment sessions this week. Pt reports having cont pain/swelling across R knee. Pt has appt with PCP following PT today. Exercise/Equipment Resistance/Repetitions Other comments               BTE- R knee flexion   [id number]    bike   [id number]     Heel slides    2x15    Quad sets    2x15       SAQ   2x15 4lbs           Mini squats      2x10            Step ups    x10ea 6in           Stair flex/ext str   4x20s ea           PROM R knee flex/ext   [id number]  Supine                           Other Therapeutic Activities:      Home Exercise Program:      Manual Treatments:  PROM R knee [id number]     Modalities:     Timed Code Treatment Minutes: Total Treatment Minutes:      Treatment/Activity Tolerance:  [x] Patient tolerated treatment well [] Patient limited by fatigue  [] Patient limited by pain  [] Patient limited by other medical complications  [] Other: Pt demonstrates good understanding of there ex. AROM R knee measured 0-105*. PROM measures to 118* with PTA assist. Cont there ex to increase ROM for both flex/ext. Strength grossly 4 to 4+/5 across R hip/knee. Cont manual therapy to increase R knee flexion with significant improvement noted. Gait mechanics are stable today. Proper heel-toe mechanics throughout.   Held on modalities due to another appt,     Prognosis: [] Good [] Fair  [] Poor    Patient Requires Follow-up: [] Yes  [] No    Plan:   [x] Continue per plan of care [] Alter current plan (see comments)  [] Plan of care initiated [] Hold pending MD visit [] Discharge  Plan for Next Session:         Electronically signed by:  Anabel Holman DPT

## 2019-06-17 NOTE — PROGRESS NOTES
DM2:  Patient is here to fu regarding DM2. Patient is  controlled. Taking all medications and tolerating well. Currently on lantus 22 units, novolog 8 units depending on blood sugars prior to meals. Fasting sugars are running 140. Patient is taking ASA and Ace Inhibitor/ARB. Patient is  on appropriately-dosed statin. LDL is  at goal.  BP is  controlled. No hypoglycemic episodes. Patient does not see Podiatry regularly. Saw an Eye Dr November 2018. Patient is aware that it is necessary to see an Eye Dr yearly. Patient does not smoke. Most recent labs reviewed with patient. Patient does have complaints or concerns today. She has been having swelling in her legs. She states that she has had the swelling for a few weeks. She states swelling gets worse as the day goes on. She denies any shortness of breath. She has not been doing anything for it. She had a mammogram in October 2018 in [facility].     She is due for a colonoscopy. Family history of colon cancer in her father. Eye exam current (within one year): yes  Weight trend: stable  Ever attended diabetic education? yes  Compliance with diet:fair   Compliance with medication: fair   Home blood glucose monitoring? frequent  Home blood sugar records: fasting range: 140  Any episodes of hypoglycemia? no  Proper foot care?yes     Lab Results   Component Value Date    LABA1C 6.7 06/17/2019     No results found for: EAG    Lab Results   Component Value Date    LDLCALC 48 12/11/2018         Patient's past medical, surgical, social and/or family history reviewed, updated in chart, and are non-contributory (unless otherwise stated). Medications andallergies also reviewed and updated in chart.       /62 (Site: Right Upper Arm, Position: Sitting, Cuff Size: Large Adult)   Pulse 69   Temp 98.1 °F (36.7 °C) (Oral)   Resp 18   Ht 5' 2\" (1.575 m)   Wt 174 lb (78.9 kg)   SpO2 99%   BMI 31.83 kg/m²     Review of Systems   Constitutional: Negative for chills, fatigue and fever. HENT: Negative for congestion, ear discharge, ear pain, postnasal drip, rhinorrhea, sinus pressure, sneezing and sore throat. Respiratory: Negative for cough, shortness of breath and wheezing. Cardiovascular: Positive for leg swelling. Negative for chest pain and palpitations. Gastrointestinal: Negative for abdominal pain, constipation, diarrhea, nausea and vomiting. Genitourinary: Negative for dysuria, frequency and hematuria. Musculoskeletal: Negative for arthralgias, back pain and myalgias. Skin: Negative for rash. Neurological: Negative for dizziness, light-headedness and headaches. Physical Exam   Constitutional: She is oriented to person, place, and time. She appears well-developed and well-nourished. HENT:   Head: Normocephalic and atraumatic. Right Ear: External ear normal.   Left Ear: External ear normal.   Nose: Nose normal.   Mouth/Throat: Oropharynx is clear and moist.   Eyes: Pupils are equal, round, and reactive to light. Conjunctivae and EOM are normal.   Neck: Normal range of motion. Neck supple. No thyromegaly present. Cardiovascular: Normal rate, regular rhythm and intact distal pulses. Pulses:       Dorsalis pedis pulses are 3+ on the right side, and 3+ on the left side. Posterior tibial pulses are 3+ on the right side, and 3+ on the left side. Pulmonary/Chest: Effort normal and breath sounds normal. No respiratory distress. She has no wheezes. Abdominal: Soft. Bowel sounds are normal. She exhibits no distension. There is no tenderness. Musculoskeletal: Normal range of motion. Feet:   Right Foot:   Protective Sensation: 10 sites tested. 10 sites sensed. Skin Integrity: Positive for skin breakdown and dry skin. Negative for ulcer or blister. Left Foot:   Protective Sensation: 10 sites tested. 10 sites sensed. Skin Integrity: Positive for skin breakdown and dry skin. Negative for ulcer or blister. Lymphadenopathy:     She has no cervical adenopathy. Neurological: She is alert and oriented to person, place, and time. Skin: Skin is warm and dry. No rash noted. Psychiatric: She has a normal mood and affect. Her behavior is normal.       Assessment:  1. Type 2 diabetes mellitus without complication, with long-term current use of insulin ([facility])  HbA1C 6.7 in the office today  Diabetic foot exam done  Labs ordered  Diet and exercise were discussed and recommended to the patient. Continue current medications  Diabetic eye exam up to date. - POCT glycosylated hemoglobin (Hb A1C)  - Diabetic Foot Exam  - Comprehensive Metabolic Panel; Future  - CBC Auto Differential; Future  - Lipid Panel; Future    2. Hyperlipidemia associated with type 2 diabetes mellitus ([address]  Labs ordered  Diet and exercise were discussed and recommended to the patient. Continue current dose of lipitor.   - Comprehensive Metabolic Panel; Future  - Lipid Panel; Future    3. Diabetic peripheral neuropathy (HCC)  Controlled Substance Monitoring:    Acute and Chronic Pain Monitoring:   RX Monitoring 6/17/2019   Attestation -   Acute Pain Prescriptions -   Periodic Controlled Substance Monitoring Possible medication side effects, risk of tolerance/dependence & alternative treatments discussed. ;No signs of potential drug abuse or diversion identified. ;Assessed functional status. ;Obtaining appropriate analgesic effect of treatment. ;Random urine drug screen sent today. Chronic Pain > 50 MEDD Considered consultation with a specialist.;Obtained or confirmed \"Consent for Opioid Use\" on file. ;Re-evaluated the status of the patient's underlying condition causing pain. Continue neurontin as prescribed. - gabapentin (NEURONTIN) 300 MG capsule; TAKE 1 CAPSULE BY MOUTH TWICE DAILY  Dispense: 60 capsule; Refill: 2    4. Vitamin D deficiency  Labs ordered  Continue current dose of vitamin d  - MAXIMUM D3 49284 units CAPS capsule;  Take 1 diagnosis, including possible risks and complications,  especially if left uncontrolled. Counseled regarding the possible side effects,risks, benefits and alternatives to treatment; patient and/or guardian verbalizes understanding, agrees, feels comfortable with and wishes to proceed with above treatment plan. Advised patient to call with any newmedication issues, and read all Rx info from pharmacy to assure aware of all possible risks and side effects of medication before taking. Reviewed age and gender appropriate health screening exams and vaccinations. Advised patient regarding importance of keeping up with recommended health maintenance and to schedule as soon as possible if overdue, as this is important in assessing for undiagnosed pathology, especially cancer, as wellas protecting against potentially harmful/life threatening disease. Patient and/or guardian verbalizes understanding and agrees with above counseling, assessment and plan. All questions answered.

## 2019-06-17 NOTE — PATIENT INSTRUCTIONS
Patient Education        betamethasone and clotrimazole topical  Pronunciation:  BAY ta METH a sone and kloe TRIM a zole TOP ik al  Brand:  Lotrisone  What is the most important information I should know about betamethasone and clotrimazole topical?  Follow all directions on your medicine label and package. Tell each of your healthcare providers about all your medical conditions, allergies, and all medicines you use. What is betamethasone and clotrimazole topical?  Betamethasone is steroid that reduces itching, swelling, and redness of the skin. Clotrimazole is an antifungal medication that fights infections caused by fungus. Betamethasone and clotrimazole topical (for the skin) is used to treat fungal skin infections such as athlete's foot, jock itch, and ringworm. Betamethasone and clotrimazole topical may also be used for purposes not listed in this medication guide. What should I discuss with my healthcare provider before using betamethasone and clotrimazole topical?  You should not use this medicine if you are allergic to betamethasone or clotrimazole. To make sure this medicine is safe for you, tell your doctor if you have:  · any type of skin infection. It is not known whether betamethasone and clotrimazole topical will harm an unborn baby. Tell your doctor if you are pregnant or plan to become pregnant while using this medicine. It is not known whether betamethasone and clotrimazole passes into breast milk or if it could harm a nursing baby. Tell your doctor if you are breast-feeding a baby. Do not use this medication on a child younger than 16years old. Children are more likely to absorb the steroid through the skin. Do not use betamethasone and clotrimazole topical to treat diaper rash. How should I use betamethasone and clotrimazole topical?  Follow all directions on your prescription label. Do not use this medicine in larger or smaller amounts or for longer than recommended.   Do not take by mouth. Betamethasone and clotrimazole topical is for use only on the skin. Do not use this medicine on open wounds or on sunburned, windburned, dry, chapped, or irritated skin. If this medicine gets in your eyes, nose, mouth, rectum, or vagina, rinse with water. This medicine comes with patient instructions for safe and effective use. Follow these directions carefully. Ask your doctor or pharmacist if you have any questions. Wash your hands before and after applying this medicine. Shake the lotion form of this medicine well just before you use it. Apply a thin layer of the medicine and rub it in completely. Do not cover treated skin areas with a bandage or tight clothing, unless your doctor has told you to. After applying the medicine, allow your skin to dry completely before dressing. Wear loose-fitting clothing while you are treating jock itch. If you are treating athlete's foot, wear clean cotton socks and keep your feet as dry as possible. Do not use betamethasone and clotrimazole for longer than 2 weeks for jock itch or 4 weeks for athlete's foot, unless your doctor has told you to. It may take up to 1 or 2 weeks of using this medicine before your symptoms improve. For best results, use medicine for the full prescribed length of time. Your symptoms may improve before the infection is completely cleared. Skipping doses may also increase your risk of further infection that is resistant to antifungal medicine. Call your doctor if your groin symptoms do not improve after 1 week, or if your foot symptoms do not improve after 2 weeks of treatment. Store this medicine at room temperature away from moisture and heat. Keep the tube or bottle capped and tightly closed when not in use. What happens if I miss a dose? Apply the missed dose as soon as you remember. Skip the missed dose if it is almost time for your next dose. Do not use extra medicine to make up the missed dose. What happens if I overdose?   An overdose of betamethasone and clotrimazole topical is not expected to be dangerous. Seek emergency medical attention or call the Poison Help line at 1-707.264.1653 if anyone has accidentally swallowed the medication. What should I avoid while using betamethasone and clotrimazole topical?  Avoid getting this medicine in your eyes, mouth, and nose, or on your lips. If it does get into any of these areas, wash with water. Do not use betamethasone and clotrimazole topical on sunburned, windburned, dry, chapped, irritated, or broken skin. Avoid wearing tight-fitting clothing that doesn't allow air circulation. Until the infection is healed, wear clothing that is made of natural fibers such as cotton. What are the possible side effects of betamethasone and clotrimazole topical?  Get emergency medical help if you have signs of an allergic reaction: hives; difficulty breathing; swelling of your face, lips, tongue, or throat. Topical steroid medicine can be absorbed through the skin, which may affect your adrenal gland. Call your doctor if you have:  · nausea, vomiting, severe dizziness;  · muscle weakness;  · depressed mood, feeling irritable;  · weight loss; or  · tired feeling. Common side effects may include:  · burning or tingling of treated skin;  · rash; or  · swelling. This is not a complete list of side effects and others may occur. Tell your doctor about any unusual or bothersome side effect. You may report side effects to FDA at 8-761-UVL-4517. What other drugs will affect betamethasone and clotrimazole topical?  It is not likely that other drugs you take orally or inject will have an effect on topically applied betamethasone and clotrimazole. But many drugs can interact with each other. Tell each of your health care providers about all medicines you use, including prescription and over-the-counter medicines, vitamins, and herbal products. Where can I get more information?   Your pharmacist can provide more professional. [address] any warranty or liability for your use of this information.

## 2019-06-18 ENCOUNTER — TELEPHONE (OUTPATIENT)
Dept: SURGERY | Age: 69
End: 2019-06-18

## 2019-06-18 NOTE — TELEPHONE ENCOUNTER
Received a call from the patient's family member who stated that she would like to make an appt with Dr. Reggie Anderson. Scheduled the patient on 6/20/19 at 9:45am in [facility]. Bring ID, medication list, insurance card and co-pay. The patient's family member verbalized understanding.    Electronically signed by Karis Blue on 6/18/19 at 2:07 PM

## 2019-06-19 ENCOUNTER — HOSPITAL ENCOUNTER (OUTPATIENT)
Dept: PHYSICAL THERAPY | Age: 69
Setting detail: THERAPIES SERIES
Discharge: HOME | End: 2019-06-19
Payer: MEDICARE

## 2019-06-19 PROCEDURE — G0283 ELEC STIM OTHER THAN WOUND: HCPCS | Performed by: PHYSICAL THERAPIST

## 2019-06-19 PROCEDURE — 97110 THERAPEUTIC EXERCISES: CPT | Performed by: PHYSICAL THERAPIST

## 2019-06-19 PROCEDURE — 97530 THERAPEUTIC ACTIVITIES: CPT | Performed by: PHYSICAL THERAPIST

## 2019-06-19 NOTE — PROGRESS NOTES
[facility]  Phone: [phone] Fax: [phone]       Physical Therapy Daily Treatment Note  Date:  2019    Patient Name:  Madeline Tapia    :  1950  MRN: [id number]    Restrictions/Precautions:    Diagnosis:  S/p R TKA  Treatment Diagnosis:    Insurance/Certification information:  [employer]  Referring Physician:  Jere Edmondson of care signed (Y/N):    Visit# / total visits:  15/12-  Pain level: 4-5/10  Time In:   08  Time Out:  935    Subjective:  Pt presents to PT for second of three treatment sessions this week. Pt reports having cont pain/swelling across R knee. Saw PCP and was given medication to reduce LE swelling. Pt reports generalized knee pain today     Exercise/Equipment Resistance/Repetitions Other comments               BTE- R knee flexion   [id number]    bike   [id number]     Heel slides    2x15    Quad sets    2x15       SAQ   2x15 4lbs           Mini squats      2x10            Step ups    x10ea 6in           Stair flex/ext str   4x20s ea                                  Other Therapeutic Activities:      Home Exercise Program:      Manual Treatments:      Modalities:  CP/IFC x 20mins R knee     Timed Code Treatment Minutes: Total Treatment Minutes:      Treatment/Activity Tolerance:  [x] Patient tolerated treatment well [] Patient limited by fatigue  [] Patient limited by pain  [] Patient limited by other medical complications  [] Other: Pt demonstrates good understanding of there ex. No measurements today. Cont there ex to increase ROM for both flex/ext. Strength grossly 4 to 4+/5 across R hip/knee. Held on manual with pt c/o generalized knee pain today. Gait mechanics are stable today. Proper heel-toe mechanics throughout.  Minimal pain reported following session     Prognosis: [] Good [] Fair  [] Poor    Patient Requires Follow-up: [] Yes  [] No    Plan:   [x] Continue per plan of care [] Alter current plan (see comments)  [] Plan

## 2019-06-20 ENCOUNTER — TELEPHONE (OUTPATIENT)
Dept: SURGERY | Age: 69
End: 2019-06-20

## 2019-06-20 ENCOUNTER — OFFICE VISIT (OUTPATIENT)
Dept: SURGERY | Age: 69
End: 2019-06-20

## 2019-06-20 VITALS
SYSTOLIC BLOOD PRESSURE: 144 MMHG | HEIGHT: 61 IN | WEIGHT: 172.6 LBS | OXYGEN SATURATION: 97 % | BODY MASS INDEX: 32.59 KG/M2 | DIASTOLIC BLOOD PRESSURE: 78 MMHG | TEMPERATURE: 98.4 F | HEART RATE: 64 BPM

## 2019-06-20 DIAGNOSIS — Z12.11 ENCOUNTER FOR SCREENING COLONOSCOPY: Primary | ICD-10-CM

## 2019-06-20 PROCEDURE — G8427 DOCREV CUR MEDS BY ELIG CLIN: HCPCS | Performed by: SURGERY

## 2019-06-20 PROCEDURE — 1090F PRES/ABSN URINE INCON ASSESS: CPT | Performed by: SURGERY

## 2019-06-20 PROCEDURE — G8400 PT W/DXA NO RESULTS DOC: HCPCS | Performed by: SURGERY

## 2019-06-20 PROCEDURE — G8598 ASA/ANTIPLAT THER USED: HCPCS | Performed by: SURGERY

## 2019-06-20 PROCEDURE — G8417 CALC BMI ABV UP PARAM F/U: HCPCS | Performed by: SURGERY

## 2019-06-20 PROCEDURE — 4040F PNEUMOC VAC/ADMIN/RCVD: CPT | Performed by: SURGERY

## 2019-06-20 PROCEDURE — 1123F ACP DISCUSS/DSCN MKR DOCD: CPT | Performed by: SURGERY

## 2019-06-20 PROCEDURE — 1036F TOBACCO NON-USER: CPT | Performed by: SURGERY

## 2019-06-20 PROCEDURE — 99999 PR OFFICE/OUTPT VISIT,PROCEDURE ONLY: CPT | Performed by: SURGERY

## 2019-06-20 PROCEDURE — 3017F COLORECTAL CA SCREEN DOC REV: CPT | Performed by: SURGERY

## 2019-06-20 NOTE — TELEPHONE ENCOUNTER
Scheduled patient for colonoscopy on 6/28/19 at 11:00am in [address] [place] [address]. Patient needs to report at the front entrance 1 hour prior to the procedure, no Asprin 81mg for 7 days, remind patient to do the colon prep as well as a clear liquid diet a day before. Patient verbalized understanding. Instruction letter handed. Encouraged patient to call our office if any questions.     Electronically signed by Vargas Solitario on 6/20/2019 at 10:34 AM

## 2019-06-20 NOTE — PROGRESS NOTES
[facility] Surgery   History and Physical    Patient's Name/Date of Birth: Gurmeet Licona / 1950 (41 y.o.)    Date: June 20, 2019     PCP: Theo Browne DO    Chief Complaint:   Chief Complaint   Patient presents with   Hector Cantrell Patient     Dr Abdullahi Lucero Colonoscopy     screening       History of Present Illness: Here for screening c scope. Last one 4/5 years ago was apparently wnl. She has a family hx of CRC. Denies sx currently. She has DM HTN CAD all under control on meds.   She does take ASA 81mg     Past Medical History:   Diagnosis Date    Anemia     low iron    CAD (coronary artery disease)     sees Dr. Ousmane Ling GERD (gastroesophageal reflux disease)     History of blood transfusion     Hyperlipidemia     Hypertension     Osteoarthritis     Type 2 diabetes mellitus without complication ([facility] [address]        Past Surgical History:   Procedure Laterality Date    CARDIAC SURGERY  2015    CAD, stents placed     CATARACT REMOVAL      bilateral    COLONOSCOPY      ENDOSCOPY, COLON, DIAGNOSTIC      HYSTERECTOMY      KNEE SURGERY Right 2006    right knee surgery    TOTAL KNEE ARTHROPLASTY Right 3/11/2019    ROBOTIC ASSISTED RIGHT KNEE TOTAL ARTHROPLASTY ++LENY++    ++ADDUCTOR CANAL BLOCK++IV DYE ALLERGY++ performed by Yani Santos MD at [facility] OR       Family History   Problem Relation Age of Onset    Diabetes Mother     High Blood Pressure Mother     Heart Disease Mother         age [de-identified]   Blaire Meade Diabetes Father     High Blood Pressure Father     Prostate Cancer Father     Colon Cancer Father     Cancer Father     Heart Attack Sister     Diabetes Brother     High Blood Pressure Brother     Diabetes Brother     High Blood Pressure Brother     Heart Attack Sister     Alzheimer's Disease Sister     Arthritis Sister     Heart Attack Sister     Diabetes Sister     Diabetes Sister     Heart Attack Sister     Heart Attack Sister     Cancer Son         lymphoma, stomach cancer    No Known Problems Daughter        Social History     Socioeconomic History    Marital status: Single     Spouse name: Not on file    Number of children: 2    Years of education: Not on file    Highest education level: Not on file   Occupational History    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[place] [address] resource strain: Not on file    Food insecurity:     Worry: Not on file     Inability: Not on file   [employer] needs:     Medical: Not on file     Non-medical: Not on file   Tobacco Use    Smoking status: Former Smoker     Packs/day: 2.00     Years: 20.00     Pack years: 40.00     Types: Cigarettes     Start date: 1961     Last attempt to quit:      Years since quittin.4    Smokeless tobacco: Never Used   Substance and Sexual Activity    Alcohol use: No    Drug use: No    Sexual activity: Not Currently   Lifestyle    Physical activity:     Days per week: Not on file     Minutes per session: Not on file    Stress: Not on file   Relationships    Social connections:     Talks on phone: Not on file     Gets together: Not on file     Attends [employer] service: Not on file     Active member of club or organization: Not on file     Attends meetings of clubs or organizations: Not on file     Relationship status: Not on file    Intimate partner violence:     Fear of current or ex partner: Not on file     Emotionally abused: Not on file     Physically abused: Not on file     Forced sexual activity: Not on file   Other Topics Concern    Not on file   Social History Narrative    Not on file       Current Outpatient Medications   Medication Sig Dispense Refill    gabapentin (NEURONTIN) 300 MG capsule TAKE 1 CAPSULE BY MOUTH TWICE DAILY 60 capsule 2    MAXIMUM D3 04430 units CAPS capsule Take 1 capsule by mouth once a week TM 1   CA SEMANAL 4 capsule 2    clotrimazole-betamethasone (LOTRISONE) 1-0.05 % cream Apply topically 2 times Eyes: negative  Ears, nose, mouth, throat, and face: negative  Respiratory: negative  Cardiovascular: negative  Gastrointestinal: negative  Genitourinary:negative  Integument/breast: negative  Hematologic/lymphatic: negative  Musculoskeletal:negative  Neurological: negative  Allergic/Immunologic: negative      Physical Exam:    @BP (!) 144/78 (Site: Left Upper Arm, Position: Sitting, Cuff Size: Medium Adult)   Pulse 64   Temp 98.4 °F (36.9 °C)   Ht 5' 1\" (1.549 m)   Wt 172 lb 9.6 oz (78.3 kg)   SpO2 97%   BMI 32.61 kg/m² @    GENERAL EXAM: On exam- pt appears stated age. No acute distress. NEURO:  Alert and oriented x 3. No obvious neuro deficits   HEENT: head- atraumatic-normocephalic. No discharge from ears, nose or throat. NECK: Supple. No jugular venous distention. CVS:Heart rate and rhythm regular. LUNGS: Bilateral chest movements without the use of accessory muscles. Respirations easy, nonlabored, breath sounds clear   ABDOMEN: Abdomen soft, nondistended, nontender, No palpable hernias noted. RECTAL: exam deferred. EXTREMITIES: No edema, NVI and symmetrical        IMPRESSION/PLAN: 78 yo female for CRC screening     - Stop ASA 81 1 week prior to procedure    I recommended colonoscopy with possible biopsy or polypectomy and I explained therisk, benefits, expected outcome, and alternatives to the procedure. Risks included but are not limited to bleeding, infection, respiratory distress, hypotension, and perforation. [address] understands and is in agreement.       Electronically Signed by Lorrie Gao MD FACS   10:09 AM

## 2019-06-20 NOTE — PATIENT INSTRUCTIONS
[facility] COLONOSCOPY PREPARATION    Instructions for Clear liquid diet  Definition  A clear liquid diet consists of clear liquids, such as water, broth and plain gelatin, that are easily digested and leave no undigested residue in your intestinal tract. Your doctor may prescribe a clear liquid diet before certain medical procedures or if you have certain digestive problems. Because a clear liquid diet can't provide you with adequate calories and nutrients, it shouldn't be continued for more than a few days. Purpose  A clear liquid diet is often used before tests, procedures or surgeries that require no food in your stomach or intestines, such as before colonoscopy. It may also be recommended as a short-term diet if you have certain digestive problems, such as nausea, vomiting or diarrhea, or after certain types of surgery. Diet details  A clear liquid diet helps maintain adequate hydration, provides some important electrolytes, such as sodium and potassium, and gives some energy at a time when a full diet isn't possible or recommended. The following foods are allowed in a clear liquid diet:    Plain water    Fruit juices without pulp, such as apple juice, white grape juice.  Strained lemonade    Clear, fat-free broth (bouillon or consomme)    Clear sodas     Plain gelatin (Not RED or PURPLE)   Honey    Ice pops without bits of fruit or fruit pulp    Tea or coffee without milk or cream   ** NOTHING RED OR PURPLE    Any foods not on the above list should be avoided. Also, for certain tests, such as colon exams, your doctor may ask you to avoid liquids or gelatin with red coloring.                                          A typical menu on the clear liquid diet may look like this:   Breakfast:  1 glass fruit juice  1 cup coffee or tea (without dairy products)  1 cup broth  1 bowl gelatin     Snack:  1 glass fruit juice  1 bowl gelatin     Lunch:  1 glass fruit juice  1 glass water  1 cup broth  1 bowl gelatin     Snack:  1 ice pop (without fruit pulp)  1 cup coffee or tea (without dairy products) or a soft drink     Dinner:  1 cup juice or water  1 cup broth  1 bowl gelatin  1 cup coffee or tea     Purchase this over the counter:  1. GATORADE/Clear liquid (64 ounces)   Pick these up at the pharmacy:  2. DULCOLAX 5 mg tablets (four tablets)  3. MIRALAX BOTTLE 238 grams (over the counter only)    The DAY BEFORE your colonoscopy:   Drink only clear liquids. (Absolutely no solid food)    COLONOSCOPY PREP:  3 PM: Take 2 DULCOLAX tablets    5 PM: Mix the entire bottle of MIRALAX into the 64 ounces of GATORADE. (Put half the bottle in each 32 ounce bottle). Shake the solution until fully dissolved. Drink an 8 ounce glass every 30 minutes until the solution is gone. 7 PM: Take the last 2 DULCOLAX tablets. **DO NOT EAT OR DRINK ANYTHING AFTER MIDNIGHT**          The DAY OF your colonoscopy: You may take any necessary medications with a sip of water. Bring along someone to take you home. REMEMBER: The preparation is very important. An adequate clean out allows for the best evaluation of your entire colon. During the prep, using baby wipes may ease some of your discomfort. You should NOT plan on working or driving the rest of the day due to sedation given at the procedure    Any Questions or concerns contact Mila at [phone]. INSULINE:    The night before surgery take only 1/2 of you normal lantus dose and don't take your regular insulin in the morning as you will be NPO. If you feel week or light headed, or your sugar is low you may drink orange juice.

## 2019-06-21 ENCOUNTER — HOSPITAL ENCOUNTER (OUTPATIENT)
Dept: PHYSICAL THERAPY | Age: 69
Setting detail: THERAPIES SERIES
Discharge: HOME | End: 2019-06-21
Payer: MEDICARE

## 2019-06-21 PROCEDURE — 97110 THERAPEUTIC EXERCISES: CPT | Performed by: PHYSICAL THERAPIST

## 2019-06-21 PROCEDURE — G0283 ELEC STIM OTHER THAN WOUND: HCPCS | Performed by: PHYSICAL THERAPIST

## 2019-06-21 PROCEDURE — 97530 THERAPEUTIC ACTIVITIES: CPT | Performed by: PHYSICAL THERAPIST

## 2019-06-21 NOTE — PROGRESS NOTES
[facility]  Phone: [phone] Fax: [phone]       Physical Therapy Daily Treatment Note  Date:  2019    Patient Name:  Lisa Mayer    :  1950  MRN: [id number]    Restrictions/Precautions:    Diagnosis:  S/p R TKA  Treatment Diagnosis:    Insurance/Certification information:  [employer] Medicare  Referring Physician:  [facility] of care signed (Y/N):    Visit# / total visits:  -  Pain level: 810  Time In:   0825  Time Out:  09    Subjective:  Pt presents to PT for three of three treatment sessions this week. Pt reports increase in knee pain. States trying to walk stairs with normal mechanics yesterday which may have increased the pain. Exercise/Equipment Resistance/Repetitions Other comments               BTE- R knee flexion   [id number]    bike   [id number]     Heel slides    2x15    Quad sets    2x15       SAQ   2x15 4lbs           Mini squats      2x10            Step ups    x10ea 6in           Stair flex/ext str   4x20s ea                                  Other Therapeutic Activities:      Home Exercise Program:      Manual Treatments:      Modalities:  CP/IFC x 20mins R knee     Timed Code Treatment Minutes: Total Treatment Minutes:      Treatment/Activity Tolerance:  [x] Patient tolerated treatment well [] Patient limited by fatigue  [] Patient limited by pain  [] Patient limited by other medical complications  [] Other: Pt demonstrates good understanding of there ex. AROM R knee measured to: 0-108*. Cont there ex to increase ROM for both flex/ext. Strength grossly 4 to 4+/5 across R hip/knee. Held on manual with pt c/o generalized knee pain today. Gait mechanics are stable today. Initially pt ambulated with decreased/antalgic gait. Proper heel-toe mechanics noted at end of session.      Prognosis: [] Good [] Fair  [] Poor    Patient Requires Follow-up: [] Yes  [] No    Plan:   [x] Continue per plan of care [] Alter current plan (see comments)  [] Plan of care initiated [] Hold pending MD visit [] Discharge  Plan for Next Session:         Electronically signed by:  Jessi Silva DPT

## 2019-06-24 ENCOUNTER — HOSPITAL ENCOUNTER (OUTPATIENT)
Dept: PHYSICAL THERAPY | Age: 69
Setting detail: THERAPIES SERIES
Discharge: HOME | End: 2019-06-24
Payer: MEDICARE

## 2019-06-24 PROCEDURE — 97110 THERAPEUTIC EXERCISES: CPT | Performed by: PHYSICAL THERAPIST

## 2019-06-24 PROCEDURE — 97530 THERAPEUTIC ACTIVITIES: CPT | Performed by: PHYSICAL THERAPIST

## 2019-06-24 PROCEDURE — G0283 ELEC STIM OTHER THAN WOUND: HCPCS | Performed by: PHYSICAL THERAPIST

## 2019-06-24 NOTE — PROGRESS NOTES
[facility]  Phone: [phone] Fax: [phone]       Physical Therapy Daily Treatment Note  Date:  2019    Patient Name:  Lindy Reeder    :  1950  MRN: [id number]    Restrictions/Precautions:    Diagnosis:  S/p R TKA  Treatment Diagnosis:    Insurance/Certification information:  [employer]  Referring Physician:  Steve Fairchild of care signed (Y/N):    Visit# / total visits:  -  Pain level: 8/10  Time In:   0815  Time Out:  930    Subjective:  Pt presents to PT for first of two treatment sessions this week. Pt reports increase in knee pain. States pain cont to vary. Knee feels heavy per pt. Exercise/Equipment Resistance/Repetitions Other comments               BTE- R knee flexion   [id number]    bike   [id number]     Heel slides    2x15    Quad sets    2x15       SAQ   2x15 4lbs           Mini squats      2x10            Step ups    x10ea 6in           Stair flex/ext str   4x20s ea                                  Other Therapeutic Activities:      Home Exercise Program:      Manual Treatments:      Modalities:  CP/IFC x 20mins R knee     Timed Code Treatment Minutes: Total Treatment Minutes:      Treatment/Activity Tolerance:  [x] Patient tolerated treatment well [] Patient limited by fatigue  [] Patient limited by pain  [] Patient limited by other medical complications  [] Other: Pt demonstrates good understanding of there ex. AROM R knee measured to: 0-104*. Slight decrease compared to previous session. Cont there ex to increase ROM for both flex/ext. Strength grossly 4 to 4+/5 across R hip/knee. Held on manual with pt c/o generalized knee pain today. Gait mechanics are stable today. Proper heel-toe mechanics noted at end of session.      Prognosis: [] Good [] Fair  [] Poor    Patient Requires Follow-up: [] Yes  [] No    Plan:   [x] Continue per plan of care [] Alter current plan (see comments)  [] Plan of care initiated [] Hold pending MD visit [] Discharge  Plan for Next Session:         Electronically signed by:  Armando Cerda DPT

## 2019-06-26 ENCOUNTER — HOSPITAL ENCOUNTER (OUTPATIENT)
Dept: PHYSICAL THERAPY | Age: 69
Setting detail: THERAPIES SERIES
Discharge: HOME | End: 2019-06-26
Payer: MEDICARE

## 2019-06-26 PROCEDURE — 97110 THERAPEUTIC EXERCISES: CPT | Performed by: PHYSICAL THERAPIST

## 2019-06-26 PROCEDURE — 97530 THERAPEUTIC ACTIVITIES: CPT | Performed by: PHYSICAL THERAPIST

## 2019-06-26 NOTE — PROGRESS NOTES
[facility]  Phone: [phone] Fax: [phone]       Physical Therapy Daily Treatment Note  Date:  2019    Patient Name:  Joleen Jeans    :  1950  MRN: [id number]    Restrictions/Precautions:    Diagnosis:  S/p R TKA  Treatment Diagnosis:    Insurance/Certification information:  [employer]  Referring Physician:  Loni Gutierrez of care signed (Y/N):    Visit# / total visits:  -  Pain level: 8/10  Time In:   0800  Time Out:  0855    Subjective:  Pt presents to PT for two of two treatment sessions this week. Pt reports cont to have increased knee pain. Exercise/Equipment Resistance/Repetitions Other comments               BTE- R knee flexion   [id number]    bike   [id number]     Heel slides    2x15    Quad sets    2x15       SAQ   2x15 4lbs           Mini squats      2x10            Step ups    x10ea 6in           Stair flex/ext str   4x20s ea                                  Other Therapeutic Activities:      Home Exercise Program:      Manual Treatments:      Modalities:  CP [id number] R knee (held on estim due to time constraints      Timed Code Treatment Minutes: Total Treatment Minutes:      Treatment/Activity Tolerance:  [x] Patient tolerated treatment well [] Patient limited by fatigue  [] Patient limited by pain  [] Patient limited by other medical complications  [] Other: Pt demonstrates good understanding of there ex. No measurements today. Cont there ex to increase ROM for both flex/ext. Strength grossly 4 to 4+/5 across R hip/knee. Held on manual with pt c/o generalized knee pain today. Session limited due to time constraints with conflicting appt. Gait stable following session.  PT recommends cont with therapy 2-3x/week x 3 weeks to increase ROM/strength R knee and to alleviate pain symptoms     Prognosis: [] Good [] Fair  [] Poor    Patient Requires Follow-up: [] Yes  [] No    Plan:   [x] Continue per plan of care [] Alter current plan (see comments)  [] Plan of care initiated [] Hold pending MD visit [] Discharge  Plan for Next Session:         Electronically signed by:  Jessi Silva DPT

## 2019-06-27 NOTE — PROGRESS NOTES
[facility]    Phone: [phone] Fax: [phone]     To: Dr. Freddy Maddox     From: Noa Hernandez DPT     Patient: Santhosh Plascencia      : 1950  Diagnosis:  R TKA     Date: 2019    Physical Therapy Progress Note    Total Visits to date:  25  Cancels/No-shows to date:      Plan of Care/Treatment to date:  [x] Therapeutic Exercise    [x] Modalities:  [x] Therapeutic Activity     [] Ultrasound  [x] Electrical Stimulation  [] Gait Training      [] Cervical Traction   [] Lumbar Traction  [] Neuromuscular Re-education  [x] Cold/hotpack [] Iontophoresis  [x] Instruction in HEP      Other:  [x] Manual Therapy       []    [] Aquatic Therapy       []                    ? Significant Findings At Last Visit/Comments:    Patient presents to PT for 18 sessions s/p R TKA. Pain remains across R knee hindering progress. AROM of R knee measures 0-104/5*. Strength grossly 4 to 4+/5 across R hip/knee. Gait stable without AD however occasional VCs required to maintain proper heel-toe mechanics. PT recommends cont with therapy 2-3x/week x 3 weeks to increase ROM/strength R knee and to alleviate pain symptoms         Progress towards goals:        Frequency/Duration:  # Days per week: [] 1 day # Weeks: [] 1 week [] 4 weeks      [x] 2 days? [] 2 weeks [] 5 weeks      [x] 3 days   [] 3 weeks [x] 6 weeks     Rehab Potential: [] Excellent [x] Good [x] Fair  [] Poor     Goal Status:  [] Achieved [x] Partially Achieved  [] Not Achieved     Patient Status: [x] Continue per initial plan of Care     [] Patient now discharged     [x] Additional visits requested, Please re-certify for additional visits:      Requested frequency/duration:  2-3X/week for 3weeks    Electronically signed by:  Noa Hernandez DPT     If you have any questions or concerns, please don't hesitate to call.   Thank you for your referral.    Physician Signature:________________________________Date:__________________  By signing above, therapists plan is approved by physician

## 2019-06-28 ENCOUNTER — ANESTHESIA (OUTPATIENT)
Dept: ENDOSCOPY | Age: 69
End: 2019-06-28
Payer: MEDICARE

## 2019-06-28 ENCOUNTER — HOSPITAL ENCOUNTER (OUTPATIENT)
Age: 69
Setting detail: OUTPATIENT SURGERY
Discharge: HOME | End: 2019-06-28
Attending: SURGERY | Admitting: SURGERY
Payer: MEDICARE

## 2019-06-28 ENCOUNTER — APPOINTMENT (OUTPATIENT)
Dept: PHYSICAL THERAPY | Age: 69
End: 2019-06-28
Payer: MEDICARE

## 2019-06-28 ENCOUNTER — PREP FOR PROCEDURE (OUTPATIENT)
Dept: SURGERY | Age: 69
End: 2019-06-28

## 2019-06-28 ENCOUNTER — ANESTHESIA EVENT (OUTPATIENT)
Dept: ENDOSCOPY | Age: 69
End: 2019-06-28
Payer: MEDICARE

## 2019-06-28 VITALS
SYSTOLIC BLOOD PRESSURE: 141 MMHG | BODY MASS INDEX: 31.65 KG/M2 | OXYGEN SATURATION: 96 % | HEIGHT: 62 IN | RESPIRATION RATE: 16 BRPM | WEIGHT: 172 LBS | DIASTOLIC BLOOD PRESSURE: 59 MMHG | TEMPERATURE: 97.2 F | HEART RATE: 56 BPM

## 2019-06-28 VITALS
RESPIRATION RATE: 20 BRPM | DIASTOLIC BLOOD PRESSURE: 52 MMHG | OXYGEN SATURATION: 100 % | SYSTOLIC BLOOD PRESSURE: 97 MMHG

## 2019-06-28 LAB — METER GLUCOSE: 141 MG/DL (ref 74–99)

## 2019-06-28 PROCEDURE — 82962 GLUCOSE BLOOD TEST: CPT

## 2019-06-28 PROCEDURE — 6360000002 HC RX W HCPCS: Performed by: NURSE ANESTHETIST, CERTIFIED REGISTERED

## 2019-06-28 PROCEDURE — 2580000003 HC RX 258: Performed by: NURSE ANESTHETIST, CERTIFIED REGISTERED

## 2019-06-28 PROCEDURE — 7100000011 HC PHASE II RECOVERY - ADDTL 15 MIN: Performed by: SURGERY

## 2019-06-28 PROCEDURE — G0121 COLON CA SCRN NOT HI RSK IND: HCPCS | Performed by: SURGERY

## 2019-06-28 PROCEDURE — 2709999900 HC NON-CHARGEABLE SUPPLY: Performed by: SURGERY

## 2019-06-28 PROCEDURE — 3609027000 HC COLONOSCOPY: Performed by: SURGERY

## 2019-06-28 PROCEDURE — 3700000000 HC ANESTHESIA ATTENDED CARE: Performed by: SURGERY

## 2019-06-28 PROCEDURE — 3700000001 HC ADD 15 MINUTES (ANESTHESIA): Performed by: SURGERY

## 2019-06-28 PROCEDURE — 2500000003 HC RX 250 WO HCPCS: Performed by: NURSE ANESTHETIST, CERTIFIED REGISTERED

## 2019-06-28 PROCEDURE — 7100000010 HC PHASE II RECOVERY - FIRST 15 MIN: Performed by: SURGERY

## 2019-06-28 RX ORDER — PROPOFOL 10 MG/ML
INJECTION, EMULSION INTRAVENOUS PRN
Status: DISCONTINUED | OUTPATIENT
Start: 2019-06-28 | End: 2019-06-28 | Stop reason: SDUPTHER

## 2019-06-28 RX ORDER — LIDOCAINE HYDROCHLORIDE 20 MG/ML
INJECTION, SOLUTION INFILTRATION; PERINEURAL PRN
Status: DISCONTINUED | OUTPATIENT
Start: 2019-06-28 | End: 2019-06-28 | Stop reason: SDUPTHER

## 2019-06-28 RX ORDER — SODIUM CHLORIDE 0.9 % (FLUSH) 0.9 %
10 SYRINGE (ML) INJECTION EVERY 12 HOURS SCHEDULED
Status: DISCONTINUED | OUTPATIENT
Start: 2019-06-28 | End: 2019-06-28 | Stop reason: HOSPADM

## 2019-06-28 RX ORDER — 0.9 % SODIUM CHLORIDE 0.9 %
10 VIAL (ML) INJECTION PRN
Status: DISCONTINUED | OUTPATIENT
Start: 2019-06-28 | End: 2019-06-28 | Stop reason: HOSPADM

## 2019-06-28 RX ORDER — SODIUM CHLORIDE 9 MG/ML
INJECTION, SOLUTION INTRAVENOUS CONTINUOUS PRN
Status: DISCONTINUED | OUTPATIENT
Start: 2019-06-28 | End: 2019-06-28 | Stop reason: SDUPTHER

## 2019-06-28 RX ORDER — SODIUM CHLORIDE 0.9 % (FLUSH) 0.9 %
10 SYRINGE (ML) INJECTION EVERY 12 HOURS SCHEDULED
Status: CANCELLED | OUTPATIENT
Start: 2019-06-28

## 2019-06-28 RX ORDER — 0.9 % SODIUM CHLORIDE 0.9 %
10 VIAL (ML) INJECTION PRN
Status: CANCELLED | OUTPATIENT
Start: 2019-06-28

## 2019-06-28 RX ADMIN — PROPOFOL 90 MG: 10 INJECTION, EMULSION INTRAVENOUS at 10:53

## 2019-06-28 RX ADMIN — PROPOFOL 40 MG: 10 INJECTION, EMULSION INTRAVENOUS at 10:56

## 2019-06-28 RX ADMIN — SODIUM CHLORIDE: 9 INJECTION, SOLUTION INTRAVENOUS at 10:50

## 2019-06-28 RX ADMIN — PROPOFOL 70 MG: 10 INJECTION, EMULSION INTRAVENOUS at 11:02

## 2019-06-28 RX ADMIN — LIDOCAINE HYDROCHLORIDE 50 MG: 20 INJECTION, SOLUTION INFILTRATION; PERINEURAL at 10:53

## 2019-06-28 ASSESSMENT — PAIN SCALES - GENERAL
PAINLEVEL_OUTOF10: 0

## 2019-06-28 ASSESSMENT — PAIN - FUNCTIONAL ASSESSMENT: PAIN_FUNCTIONAL_ASSESSMENT: 0-10

## 2019-06-28 NOTE — H&P
gabapentin (NEURONTIN) 300 MG capsule TAKE 1 CAPSULE BY MOUTH TWICE DAILY 60 capsule 2    MAXIMUM D3 53656 units CAPS capsule Take 1 capsule by mouth once a week TM 1   CA SEMANAL 4 capsule 2    clotrimazole-betamethasone (LOTRISONE) 1-0.05 % cream Apply topically 2 times daily. 1 Tube 1    furosemide (LASIX) 20 MG tablet 1 tab po daily as needed for swelling 30 tablet 2    metoprolol tartrate (LOPRESSOR) 25 MG tablet TAKE 1 TABLET BY MOUTH TWICE DAILY. TAKE IN MORNING OF PROCEDURE 180 tablet 1    ASPIRIN LOW DOSE 81 MG EC tablet ASHISH ALY TABLETA DIARIA   2    blood glucose test strips (ACCU-CHEK MISHEL PLUS) strip 1 each by In Vitro route 3 times daily As needed.  100 each 3    divalproex (DEPAKOTE ER) 500 MG extended release tablet Take 1 tablet by mouth daily (Patient taking differently: Take 500 mg by mouth daily Instructed to take am of procedure) 30 tablet 3    omeprazole (PRILOSEC) 40 MG delayed release capsule Take 1 capsule by mouth daily (Patient taking differently: Take 40 mg by mouth daily Instructed to take am of procedure) 90 capsule 1    ranitidine (ZANTAC) 300 MG tablet TAKE 1 TABLET BY MOUTH EVERY NIGHT 90 tablet 3    ramipril (ALTACE) 5 MG capsule TAKE 1 CAPSULE BY MOUTH DAILY 90 capsule 3    PARoxetine (PAXIL) 10 MG tablet TAKE 1 TABLET BY MOUTH EVERY MORNING (Patient taking differently: Take 10 mg by mouth every morning Instructed to take am of procedure) 90 tablet 3    atorvastatin (LIPITOR) 40 MG tablet TAKE 1 TABLET BY MOUTH DAILY 90 tablet 3    glucose monitoring kit (FREESTYLE) monitoring kit 1 kit by Does not apply route daily 1 kit 0    insulin aspart (NOVOLOG) 100 UNIT/ML injection vial Inject into the skin 3 times daily (before meals)        insulin glargine (LANTUS) 100 UNIT/ML injection vial Inject 22 Units into the skin nightly Instructed to take 1/2 dose evening insulin, but no insulin after midnight        loratadine (CLARITIN) 10 MG tablet Take 10 mg by mouth as needed         ibuprofen (ADVIL;MOTRIN) 400 MG tablet Take 400 mg by mouth every 6 hours as needed for Pain          No current facility-administered medications for this visit.                  Allergies   Allergen Reactions    Dye [Iodides]           REVIEW OF SYSTEMS:    Constitutional: negative   Eyes: negative  Ears, nose, mouth, throat, and face: negative  Respiratory: negative  Cardiovascular: negative  Gastrointestinal: negative  Genitourinary:negative  Integument/breast: negative  Hematologic/lymphatic: negative  Musculoskeletal:negative  Neurological: negative  Allergic/Immunologic: negative        Physical Exam:     @BP (!) 144/78 (Site: Left Upper Arm, Position: Sitting, Cuff Size: Medium Adult)   Pulse 64   Temp 98.4 °F (36.9 °C)   Ht 5' 1\" (1.549 m)   Wt 172 lb 9.6 oz (78.3 kg)   SpO2 97%   BMI 32.61 kg/m² @     GENERAL EXAM: On exam- pt appears stated age. No acute distress. NEURO:  Alert and oriented x 3. No obvious neuro deficits   HEENT: head- atraumatic-normocephalic. No discharge from ears, nose or throat. NECK: Supple. No jugular venous distention. CVS:Heart rate and rhythm regular. LUNGS: Bilateral chest movements without the use of accessory muscles. Respirations easy, nonlabored, breath sounds clear   ABDOMEN: Abdomen soft, nondistended, nontender, No palpable hernias noted. RECTAL: exam deferred. EXTREMITIES: No edema, NVI and symmetrical        IMPRESSION/PLAN: 76 yo female for CRC screening      - Stop ASA 81 1 week prior to procedure     I recommended colonoscopy with possible biopsy or polypectomy and I explained therisk, benefits, expected outcome, and alternatives to the procedure. Risks included but are not limited to bleeding, infection, respiratory distress, hypotension, and perforation. [address] understands and is in agreement.        Electronically Signed by Levi Trinidad MD FACS   10:09 AM      No changes, she has been off ASA for 1 week.     Alfie Gregorio Hilda Chauhan MD

## 2019-06-28 NOTE — ANESTHESIA POSTPROCEDURE EVALUATION
Department of Anesthesiology  Postprocedure Note    Patient: Loraine Peralta  MRN: [id number]  YOB: 1950  Date of evaluation: 6/28/2019  Time:  11:38 AM     Procedure Summary     Date:  06/28/19 Room / Location:  [facility] / [facility] ENDOSCOPY    Anesthesia Start:  1050 Anesthesia Stop:  1119    Procedure:  COLORECTAL CANCER SCREENING, NOT HIGH RISK   +++IODINE ALLERGY+++    ++++ (N/A ) Diagnosis:  (SCREENING)    Surgeon:  Loretha Riedel, MD Responsible Provider:  Abbi Harper MD    Anesthesia Type:  MAC ASA Status:  3          Anesthesia Type: MAC    Tyler Phase I: Tyler Score: 10    Tyler Phase II: Tyler Score: 10    Last vitals: Reviewed and per EMR flowsheets.        Anesthesia Post Evaluation

## 2019-06-28 NOTE — ANESTHESIA PRE PROCEDURE
Department of Anesthesiology  Preprocedure Note       Name:  Lindy Reeder   Age:  77 y.o.  :  1950                                          MRN:  [id number]         Date:  2019      Surgeon: Rob Santa):  Rohan Long MD    Procedure: COLORECTAL CANCER SCREENING, NOT HIGH RISK   +++IODINE ALLERGY+++    ++++ (N/A )    Medications prior to admission:   Prior to Admission medications    Medication Sig Start Date End Date Taking? Authorizing Provider   gabapentin (NEURONTIN) 300 MG capsule TAKE 1 CAPSULE BY MOUTH TWICE DAILY 19  Angie Mcclure DO   MAXIMUM D3 24378 units CAPS capsule Take 1 capsule by mouth once a week TM 1   [facility] 19   Mona English DO   clotrimazole-betamethasone (LOTRISONE) 1-0.05 % cream Apply topically 2 times daily. 19   Angie Mcclure DO   furosemide (LASIX) 20 MG tablet 1 tab po daily as needed for swelling 19   Paulina Alexandra English DO   metoprolol tartrate (LOPRESSOR) 25 MG tablet TAKE 1 TABLET BY MOUTH TWICE DAILY. TAKE IN MORNING OF PROCEDURE 19   Mona English DO   ASPIRIN LOW DOSE 81 MG EC tablet ASHISH [address] 19   Historical Provider, MD   ibuprofen (ADVIL;MOTRIN) 400 MG tablet Take 400 mg by mouth every 6 hours as needed for Pain    Historical Provider, MD   blood glucose test strips (ACCU-CHEK MISHEL PLUS) strip 1 each by In Vitro route 3 times daily As needed.  19   Angie Mcclure DO   divalproex (DEPAKOTE ER) 500 MG extended release tablet Take 1 tablet by mouth daily 19   SEBASTIEN Lewis   omeprazole (PRILOSEC) 40 MG delayed release capsule Take 1 capsule by mouth daily 19   SEBASTIEN Lewis   ranitidine (ZANTAC) 300 MG tablet TAKE 1 TABLET BY MOUTH EVERY NIGHT 19   SEBASTIEN Lewis   ramipril (ALTACE) 5 MG capsule TAKE 1 CAPSULE BY MOUTH DAILY 19   SEBASTIEN Lewis   PARoxetine (PAXIL) 10 MG tablet TAKE 1 TABLET BY MOUTH EVERY MORNING  Patient taking differently: Take 10 mg by mouth every morning Instructed to take am of procedure 1/14/19   Ken Evangelista DO   atorvastatin (LIPITOR) 40 MG tablet TAKE 1 TABLET BY MOUTH DAILY 1/14/19   SEBASTIEN Valverde   glucose monitoring kit (FREESTYLE) monitoring kit 1 kit by Does not apply route daily 12/11/18   Mona English DO   insulin aspart (NOVOLOG) 100 UNIT/ML injection vial Inject into the skin 3 times daily (before meals)    Historical Provider, MD   insulin glargine (LANTUS) 100 UNIT/ML injection vial Inject 22 Units into the skin nightly Instructed to take 1/2 dose evening insulin, but no insulin after midnight    Historical Provider, MD   loratadine (CLARITIN) 10 MG tablet Take 10 mg by mouth as needed     Historical Provider, MD       Current medications:    No current facility-administered medications for this encounter. Allergies:     Allergies   Allergen Reactions    Dye [Iodides]        Problem List:    Patient Active Problem List   Diagnosis Code    Type 2 diabetes mellitus without complication, with long-term current use of insulin ([address] E11.9, Z79.4    Diabetic peripheral neuropathy ([address] E11.42    Primary osteoarthritis of right knee M17.11    Essential hypertension I10    Hyperlipidemia associated with type 2 diabetes mellitus ([address] E11.69, E78.5    CAD (coronary artery disease) I25.10    Status post total knee replacement Z96.659       Past Medical History:        Diagnosis Date    Anemia     low iron    CAD (coronary artery disease)     sees Dr. Alecia De Leon GERD (gastroesophageal reflux disease)     History of blood transfusion     Hyperlipidemia     Hypertension     Migraines     Osteoarthritis     Type 2 diabetes mellitus without complication ([address]        Past Surgical History:        Procedure Laterality Date    CARDIAC SURGERY  2015    CAD, stents placed     CATARACT REMOVAL      bilateral    COLONOSCOPY      ENDOSCOPY, COLON, DIAGNOSTIC      HYSTERECTOMY      PROTIME, INR, APTT    HCG (If Applicable): No results found for: PREGTESTUR, PREGSERUM, HCG, HCGQUANT     ABGs: No results found for: PHART, PO2ART, SJY6WPB, ESS8JRB, BEART, Z4UBUYEF     Type & Screen (If Applicable):  No results found for: DANAE [facility]    Anesthesia Evaluation  Patient summary reviewed and Nursing notes reviewed no history of anesthetic complications:   Airway: Mallampati: II  TM distance: >3 FB   Neck ROM: full  Mouth opening: > = 3 FB Dental:    (+) upper dentures and lower dentures      Pulmonary:Negative Pulmonary ROS breath sounds clear to auscultation                             Cardiovascular:  Exercise tolerance: good (>4 METS),   (+) hypertension:, CAD:, CABG/stent: no interval change, hyperlipidemia      ECG reviewed  Rhythm: regular  Rate: normal      Cleared by cardiology     Beta Blocker:  Dose within 24 Hrs         Neuro/Psych:   (+) neuromuscular disease:, headaches: migraine headaches,              ROS comment: Diabetic neuropathy- bilateral feet GI/Hepatic/Renal:   (+) GERD: no interval change,           Endo/Other:    (+) DiabetesType II DM, using insulin, . ROS comment: obese Abdominal:           Vascular: negative vascular ROS. Anesthesia Plan      MAC     ASA 3       Induction: intravenous. MIPS: Postoperative opioids intended. Anesthetic plan and risks discussed with patient and child/children (and ). Plan discussed with CRNA.                       Lillian Flynn MD   6/28/2019

## 2019-07-01 ENCOUNTER — HOSPITAL ENCOUNTER (OUTPATIENT)
Dept: PHYSICAL THERAPY | Age: 69
Setting detail: THERAPIES SERIES
Discharge: HOME | End: 2019-07-01
Payer: MEDICARE

## 2019-07-01 PROCEDURE — 97530 THERAPEUTIC ACTIVITIES: CPT | Performed by: PHYSICAL THERAPIST

## 2019-07-01 PROCEDURE — 97110 THERAPEUTIC EXERCISES: CPT | Performed by: PHYSICAL THERAPIST

## 2019-07-01 PROCEDURE — G0283 ELEC STIM OTHER THAN WOUND: HCPCS | Performed by: PHYSICAL THERAPIST

## 2019-07-03 ENCOUNTER — HOSPITAL ENCOUNTER (OUTPATIENT)
Dept: PHYSICAL THERAPY | Age: 69
Setting detail: THERAPIES SERIES
Discharge: HOME | End: 2019-07-03
Payer: MEDICARE

## 2019-07-03 PROCEDURE — 97110 THERAPEUTIC EXERCISES: CPT | Performed by: PHYSICAL THERAPIST

## 2019-07-03 PROCEDURE — 97530 THERAPEUTIC ACTIVITIES: CPT | Performed by: PHYSICAL THERAPIST

## 2019-07-05 ENCOUNTER — HOSPITAL ENCOUNTER (OUTPATIENT)
Dept: PHYSICAL THERAPY | Age: 69
Setting detail: THERAPIES SERIES
Discharge: HOME | End: 2019-07-05
Payer: MEDICARE

## 2019-07-05 PROCEDURE — 97110 THERAPEUTIC EXERCISES: CPT | Performed by: PHYSICAL THERAPIST

## 2019-07-05 PROCEDURE — G0283 ELEC STIM OTHER THAN WOUND: HCPCS | Performed by: PHYSICAL THERAPIST

## 2019-07-05 PROCEDURE — 97530 THERAPEUTIC ACTIVITIES: CPT | Performed by: PHYSICAL THERAPIST

## 2019-07-09 ENCOUNTER — TELEPHONE (OUTPATIENT)
Dept: FAMILY MEDICINE CLINIC | Age: 69
End: 2019-07-09

## 2019-07-09 DIAGNOSIS — Z12.31 ENCOUNTER FOR SCREENING MAMMOGRAM FOR MALIGNANT NEOPLASM OF BREAST: Primary | ICD-10-CM

## 2019-07-12 ENCOUNTER — OFFICE VISIT (OUTPATIENT)
Dept: ORTHOPEDIC SURGERY | Age: 69
End: 2019-07-12
Payer: MEDICARE

## 2019-07-12 VITALS
SYSTOLIC BLOOD PRESSURE: 142 MMHG | HEART RATE: 67 BPM | HEIGHT: 62 IN | DIASTOLIC BLOOD PRESSURE: 66 MMHG | BODY MASS INDEX: 31.65 KG/M2 | WEIGHT: 172 LBS

## 2019-07-12 DIAGNOSIS — M25.561 CHRONIC PAIN OF RIGHT KNEE: ICD-10-CM

## 2019-07-12 DIAGNOSIS — M17.11 PRIMARY OSTEOARTHRITIS OF RIGHT KNEE: ICD-10-CM

## 2019-07-12 DIAGNOSIS — G89.29 CHRONIC PAIN OF RIGHT KNEE: ICD-10-CM

## 2019-07-12 DIAGNOSIS — Z96.651 STATUS POST TOTAL RIGHT KNEE REPLACEMENT: Primary | ICD-10-CM

## 2019-07-12 PROCEDURE — 3017F COLORECTAL CA SCREEN DOC REV: CPT | Performed by: ORTHOPAEDIC SURGERY

## 2019-07-12 PROCEDURE — 1123F ACP DISCUSS/DSCN MKR DOCD: CPT | Performed by: ORTHOPAEDIC SURGERY

## 2019-07-12 PROCEDURE — 4040F PNEUMOC VAC/ADMIN/RCVD: CPT | Performed by: ORTHOPAEDIC SURGERY

## 2019-07-12 PROCEDURE — G8400 PT W/DXA NO RESULTS DOC: HCPCS | Performed by: ORTHOPAEDIC SURGERY

## 2019-07-12 PROCEDURE — 99213 OFFICE O/P EST LOW 20 MIN: CPT | Performed by: ORTHOPAEDIC SURGERY

## 2019-07-12 PROCEDURE — G8417 CALC BMI ABV UP PARAM F/U: HCPCS | Performed by: ORTHOPAEDIC SURGERY

## 2019-07-12 PROCEDURE — G8598 ASA/ANTIPLAT THER USED: HCPCS | Performed by: ORTHOPAEDIC SURGERY

## 2019-07-12 PROCEDURE — 1036F TOBACCO NON-USER: CPT | Performed by: ORTHOPAEDIC SURGERY

## 2019-07-12 PROCEDURE — 1090F PRES/ABSN URINE INCON ASSESS: CPT | Performed by: ORTHOPAEDIC SURGERY

## 2019-07-12 PROCEDURE — G8427 DOCREV CUR MEDS BY ELIG CLIN: HCPCS | Performed by: ORTHOPAEDIC SURGERY

## 2019-07-15 ENCOUNTER — HOSPITAL ENCOUNTER (OUTPATIENT)
Dept: PHYSICAL THERAPY | Age: 69
Setting detail: THERAPIES SERIES
Discharge: HOME | End: 2019-07-15
Payer: MEDICARE

## 2019-07-15 PROCEDURE — G0283 ELEC STIM OTHER THAN WOUND: HCPCS | Performed by: PHYSICAL THERAPIST

## 2019-07-15 PROCEDURE — 97530 THERAPEUTIC ACTIVITIES: CPT | Performed by: PHYSICAL THERAPIST

## 2019-07-15 PROCEDURE — 97110 THERAPEUTIC EXERCISES: CPT | Performed by: PHYSICAL THERAPIST

## 2019-07-17 ENCOUNTER — HOSPITAL ENCOUNTER (OUTPATIENT)
Dept: GENERAL RADIOLOGY | Age: 69
Discharge: HOME | End: 2019-07-19
Payer: MEDICARE

## 2019-07-17 DIAGNOSIS — M89.9 DISORDER OF BONE: ICD-10-CM

## 2019-07-17 DIAGNOSIS — Z13.820 SCREENING FOR OSTEOPOROSIS: ICD-10-CM

## 2019-07-17 PROCEDURE — 77080 DXA BONE DENSITY AXIAL: CPT

## 2019-07-19 ENCOUNTER — HOSPITAL ENCOUNTER (OUTPATIENT)
Dept: PHYSICAL THERAPY | Age: 69
Setting detail: THERAPIES SERIES
Discharge: HOME | End: 2019-07-19
Payer: MEDICARE

## 2019-07-19 PROCEDURE — 97530 THERAPEUTIC ACTIVITIES: CPT | Performed by: PHYSICAL THERAPIST

## 2019-07-19 PROCEDURE — 97110 THERAPEUTIC EXERCISES: CPT | Performed by: PHYSICAL THERAPIST

## 2019-07-19 PROCEDURE — G0283 ELEC STIM OTHER THAN WOUND: HCPCS | Performed by: PHYSICAL THERAPIST

## 2019-07-22 ENCOUNTER — HOSPITAL ENCOUNTER (OUTPATIENT)
Dept: PHYSICAL THERAPY | Age: 69
Setting detail: THERAPIES SERIES
Discharge: HOME | End: 2019-07-22
Payer: MEDICARE

## 2019-07-22 PROCEDURE — 97110 THERAPEUTIC EXERCISES: CPT | Performed by: PHYSICAL THERAPIST

## 2019-07-22 PROCEDURE — G0283 ELEC STIM OTHER THAN WOUND: HCPCS | Performed by: PHYSICAL THERAPIST

## 2019-07-22 PROCEDURE — 97530 THERAPEUTIC ACTIVITIES: CPT | Performed by: PHYSICAL THERAPIST

## 2019-07-24 ENCOUNTER — OFFICE VISIT (OUTPATIENT)
Dept: FAMILY MEDICINE CLINIC | Age: 69
End: 2019-07-24
Payer: MEDICARE

## 2019-07-24 VITALS
BODY MASS INDEX: 31.28 KG/M2 | HEIGHT: 62 IN | SYSTOLIC BLOOD PRESSURE: 136 MMHG | HEART RATE: 69 BPM | OXYGEN SATURATION: 97 % | WEIGHT: 170 LBS | DIASTOLIC BLOOD PRESSURE: 76 MMHG | TEMPERATURE: 98.7 F | RESPIRATION RATE: 18 BRPM

## 2019-07-24 DIAGNOSIS — M17.11 PRIMARY OSTEOARTHRITIS OF RIGHT KNEE: ICD-10-CM

## 2019-07-24 DIAGNOSIS — F32.A DEPRESSION, UNSPECIFIED DEPRESSION TYPE: ICD-10-CM

## 2019-07-24 DIAGNOSIS — E11.9 TYPE 2 DIABETES MELLITUS WITHOUT COMPLICATION, WITH LONG-TERM CURRENT USE OF INSULIN (HCC): Primary | ICD-10-CM

## 2019-07-24 DIAGNOSIS — Z79.4 TYPE 2 DIABETES MELLITUS WITHOUT COMPLICATION, WITH LONG-TERM CURRENT USE OF INSULIN (HCC): Primary | ICD-10-CM

## 2019-07-24 PROCEDURE — 1090F PRES/ABSN URINE INCON ASSESS: CPT | Performed by: FAMILY MEDICINE

## 2019-07-24 PROCEDURE — 1036F TOBACCO NON-USER: CPT | Performed by: FAMILY MEDICINE

## 2019-07-24 PROCEDURE — 3017F COLORECTAL CA SCREEN DOC REV: CPT | Performed by: FAMILY MEDICINE

## 2019-07-24 PROCEDURE — 2022F DILAT RTA XM EVC RTNOPTHY: CPT | Performed by: FAMILY MEDICINE

## 2019-07-24 PROCEDURE — G8427 DOCREV CUR MEDS BY ELIG CLIN: HCPCS | Performed by: FAMILY MEDICINE

## 2019-07-24 PROCEDURE — 3044F HG A1C LEVEL LT 7.0%: CPT | Performed by: FAMILY MEDICINE

## 2019-07-24 PROCEDURE — G8598 ASA/ANTIPLAT THER USED: HCPCS | Performed by: FAMILY MEDICINE

## 2019-07-24 PROCEDURE — G8399 PT W/DXA RESULTS DOCUMENT: HCPCS | Performed by: FAMILY MEDICINE

## 2019-07-24 PROCEDURE — 1123F ACP DISCUSS/DSCN MKR DOCD: CPT | Performed by: FAMILY MEDICINE

## 2019-07-24 PROCEDURE — G8417 CALC BMI ABV UP PARAM F/U: HCPCS | Performed by: FAMILY MEDICINE

## 2019-07-24 PROCEDURE — 99213 OFFICE O/P EST LOW 20 MIN: CPT | Performed by: FAMILY MEDICINE

## 2019-07-24 PROCEDURE — 4040F PNEUMOC VAC/ADMIN/RCVD: CPT | Performed by: FAMILY MEDICINE

## 2019-07-24 RX ORDER — DIVALPROEX SODIUM 500 MG/1
500 TABLET, EXTENDED RELEASE ORAL DAILY
Qty: 30 TABLET | Refills: 3 | Status: SHIPPED | OUTPATIENT
Start: 2019-07-24 | End: 2019-07-24 | Stop reason: SDUPTHER

## 2019-07-24 RX ORDER — NAPROXEN 500 MG/1
TABLET ORAL
Qty: 180 TABLET | Refills: 5 | Status: SHIPPED | OUTPATIENT
Start: 2019-07-24

## 2019-07-24 RX ORDER — DIVALPROEX SODIUM 500 MG/1
500 TABLET, EXTENDED RELEASE ORAL DAILY
Qty: 90 TABLET | Refills: 3 | Status: SHIPPED | OUTPATIENT
Start: 2019-07-24

## 2019-07-24 RX ORDER — NAPROXEN 500 MG/1
500 TABLET ORAL 2 TIMES DAILY WITH MEALS
Qty: 60 TABLET | Refills: 5 | Status: SHIPPED | OUTPATIENT
Start: 2019-07-24 | End: 2019-07-24 | Stop reason: SDUPTHER

## 2019-07-24 ASSESSMENT — ENCOUNTER SYMPTOMS
RHINORRHEA: 0
NAUSEA: 0
VOMITING: 0
BACK PAIN: 0
WHEEZING: 0
COUGH: 0
DIARRHEA: 0
SINUS PRESSURE: 0
CONSTIPATION: 0
ABDOMINAL PAIN: 0
SHORTNESS OF BREATH: 0
SORE THROAT: 0

## 2019-07-24 NOTE — PATIENT INSTRUCTIONS
should not use naproxen if you are allergic to it, or if you have ever had an asthma attack or severe allergic reaction after taking aspirin or an NSAID. Ask a doctor or pharmacist if it is safe for you to use this medicine if you have:  · heart disease, high blood pressure, high cholesterol, diabetes, or if you smoke;  · a history of heart attack, stroke, or blood clot;  · a history of stomach ulcers or bleeding;  · asthma;  · liver or kidney disease; or  · fluid retention. Taking naproxen during the last 3 months of pregnancy may harm the unborn baby. Ask a doctor before using this medicine if you are pregnant. Naproxen may interfere with ovulation, causing temporary infertility. Naproxen can pass into breast milk and may cause side effects in the nursing baby. You should not breast-feed while using this medicine. Naproxen is not approved for use by anyone younger than 3years old. Do not give this medicine to a child without medical advice. How should I take naproxen? Use exactly as directed on the label, or as prescribed by your doctor. Do not take this medicine in larger amounts or for longer than recommended. Use the lowest dose that is effective in treating your condition. Do not crush, chew, or break a naproxen tablet. Swallow it whole. Shake the oral suspension (liquid) well just before you measure a dose. Measure liquid medicine with the dosing syringe provided, or with a special dose-measuring spoon or medicine cup. If you do not have a dose-measuring device, ask your pharmacist for one. If you change brands, strengths, or forms of naproxen, your dosage needs may change. Ask your pharmacist if you have any questions about the kind of naproxen you are using. If a child is using this medicine, tell your doctor if the child has any changes in weight. Naproxen doses are based on weight in children, and any changes may affect your child's dose.   If you use naproxen long-term, you may need frequent

## 2019-07-26 ENCOUNTER — HOSPITAL ENCOUNTER (OUTPATIENT)
Dept: PHYSICAL THERAPY | Age: 69
Setting detail: THERAPIES SERIES
Discharge: HOME | End: 2019-07-26
Payer: MEDICARE

## 2019-07-26 PROCEDURE — G0283 ELEC STIM OTHER THAN WOUND: HCPCS | Performed by: PHYSICAL THERAPIST

## 2019-07-26 PROCEDURE — 97530 THERAPEUTIC ACTIVITIES: CPT | Performed by: PHYSICAL THERAPIST

## 2019-07-26 PROCEDURE — 97110 THERAPEUTIC EXERCISES: CPT | Performed by: PHYSICAL THERAPIST

## 2019-08-08 RX ORDER — ASPIRIN 81 MG/1
TABLET, COATED ORAL
Qty: 30 TABLET | Refills: 11 | Status: SHIPPED | OUTPATIENT
Start: 2019-08-08

## 2019-08-29 DIAGNOSIS — K21.9 GASTROESOPHAGEAL REFLUX DISEASE WITHOUT ESOPHAGITIS: ICD-10-CM

## 2019-08-29 RX ORDER — OMEPRAZOLE 40 MG/1
40 CAPSULE, DELAYED RELEASE ORAL DAILY
Qty: 90 CAPSULE | Refills: 1 | Status: SHIPPED | OUTPATIENT
Start: 2019-08-29

## (undated) DEVICE — TOWEL,OR,DSP,ST,BLUE,STD,6/PK,12PK/CS: Brand: MEDLINE

## (undated) DEVICE — BLADE SURG SAW STD S STL OSC W/ SERR EDGE DISP

## (undated) DEVICE — STRYKER PERFORMANCE SERIES SAGITTAL BLADE: Brand: STRYKER PERFORMANCE SERIES

## (undated) DEVICE — KIT TRK KNEE PROC VIZADISC

## (undated) DEVICE — DOUBLE BASIN SET: Brand: MEDLINE INDUSTRIES, INC.

## (undated) DEVICE — KIT DRP FOR RIO ROBOTIC ARM ASST SYS

## (undated) DEVICE — TOTAL KNEE PK

## (undated) DEVICE — TUBING, SUCTION, 9/32" X 10', STRAIGHT: Brand: MEDLINE

## (undated) DEVICE — CHLORAPREP 26ML ORANGE

## (undated) DEVICE — KIT INT FIX FEM TIB CKPT MAKOPLASTY

## (undated) DEVICE — SYRINGE MED 30ML STD CLR PLAS LUERLOCK TIP N CTRL DISP

## (undated) DEVICE — TAPE ADH W3INXL10YD WHT COT WVN BK POWERFUL RUB BASE HIGHLY

## (undated) DEVICE — TRAY STRYKER MAKO TOTAL KNEE POWER

## (undated) DEVICE — CORD RETRCT SIL

## (undated) DEVICE — BANDAGE COMPR W6INXL12FT SMOOTH FOR LIMB EXSANG ESMARCH

## (undated) DEVICE — Z INACTIVE USE 2660664 SOLUTION IRRIG 3000ML 0.9% SOD CHL USP UROMATIC PLAS CONT

## (undated) DEVICE — GRADUATE TRIANG MEASURE 1000ML BLK PRNT

## (undated) DEVICE — 4-PORT MANIFOLD: Brand: NEPTUNE 2

## (undated) DEVICE — SKIN AFFIX SURG ADHESIVE 72/CS 0.55ML: Brand: MEDLINE

## (undated) DEVICE — PIN BNE FIX TEMP L140MM DIA4MM MAKO

## (undated) DEVICE — SUTURE VIC +  4-0 27 IN PS1 UD VCP935H

## (undated) DEVICE — STRIP,CLOSURE,WOUND,MEDI-STRIP,1/2X4: Brand: MEDLINE

## (undated) DEVICE — STANDARD HYPODERMIC NEEDLE,POLYPROPYLENE HUB: Brand: MONOJECT

## (undated) DEVICE — TRAY STRYKER MAKO LEG POSITIONER INSTR

## (undated) DEVICE — PATIENT RETURN ELECTRODE, SINGLE-USE, CONTACT QUALITY MONITORING, ADULT, WITH 9FT CORD, FOR PATIENTS WEIGING OVER 33LBS. (15KG): Brand: MEGADYNE

## (undated) DEVICE — GOWN,SIRUS,POLYRNF,BRTHSLV,XLN/XL,20/CS: Brand: MEDLINE

## (undated) DEVICE — DRESSING FOAM W22XL25CM FILVE LAYR FOAM DP DEF SAFETAC

## (undated) DEVICE — 3M™ STERI-DRAPE™ U-DRAPE 1015: Brand: STERI-DRAPE™

## (undated) DEVICE — TRAY STRYKER MAKO TOTAL KNEE ARRAY

## (undated) DEVICE — DRAPE,REIN 53X77,STERILE: Brand: MEDLINE

## (undated) DEVICE — 3M™ IOBAN™ 2 ANTIMICROBIAL INCISE DRAPE 6650EZ: Brand: IOBAN™ 2

## (undated) DEVICE — PIN BNE FIX TEMP L110MM DIA4MM MAKO

## (undated) DEVICE — DRAPE,TOP,102X53,STERILE: Brand: MEDLINE

## (undated) DEVICE — ZIMMER® STERILE DISPOSABLE TOURNIQUET CUFF WITH PLC, DUAL PORT, SINGLE BLADDER, 42 IN. (107 CM)

## (undated) DEVICE — SOLUTION IV IRRIG WATER 1000ML POUR BRL 2F7114

## (undated) DEVICE — PADDING CAST W6INXL4YD COT LO LINTING WYTEX

## (undated) DEVICE — SPONGE LAP W18XL18IN WHT COT 4 PLY FLD STRUNG RADPQ DISP ST

## (undated) DEVICE — PACK PROCEDURE SURG GEN CUST

## (undated) DEVICE — 3 BONE CEMENT MIXER: Brand: MIXEVAC

## (undated) DEVICE — TUBE IRRIG HNDPC HI FLO TP INTRPULS W/SUCTION TUBE

## (undated) DEVICE — Z DISCONTINUED USE 2368412 KIT POS LEG DISP

## (undated) DEVICE — BASIC SINGLE BASIN 1-LF: Brand: MEDLINE INDUSTRIES, INC.

## (undated) DEVICE — BNDG,ELSTC,MATRIX,STRL,6"X5YD,LF,HOOK&LP: Brand: MEDLINE